# Patient Record
Sex: FEMALE | Race: BLACK OR AFRICAN AMERICAN | NOT HISPANIC OR LATINO | Employment: UNEMPLOYED | URBAN - METROPOLITAN AREA
[De-identification: names, ages, dates, MRNs, and addresses within clinical notes are randomized per-mention and may not be internally consistent; named-entity substitution may affect disease eponyms.]

---

## 2019-08-20 ENCOUNTER — HOSPITAL ENCOUNTER (EMERGENCY)
Facility: HOSPITAL | Age: 64
Discharge: HOME | End: 2019-08-20
Attending: EMERGENCY MEDICINE | Admitting: EMERGENCY MEDICINE
Payer: COMMERCIAL

## 2019-08-20 ENCOUNTER — APPOINTMENT (EMERGENCY)
Dept: CT IMAGING | Facility: HOSPITAL | Age: 64
End: 2019-08-20
Payer: COMMERCIAL

## 2019-08-20 VITALS
DIASTOLIC BLOOD PRESSURE: 70 MMHG | BODY MASS INDEX: 33.45 KG/M2 | RESPIRATION RATE: 13 BRPM | TEMPERATURE: 98.1 F | HEIGHT: 66 IN | OXYGEN SATURATION: 100 % | WEIGHT: 208.11 LBS | HEART RATE: 53 BPM | SYSTOLIC BLOOD PRESSURE: 149 MMHG

## 2019-08-20 DIAGNOSIS — R10.9 ABDOMINAL PAIN: ICD-10-CM

## 2019-08-20 DIAGNOSIS — R07.9 CHEST PAIN: ICD-10-CM

## 2019-08-20 DIAGNOSIS — K76.9 LIVER LESION: Primary | ICD-10-CM

## 2019-08-20 LAB
ALBUMIN SERPL BCP-MCNC: 3.9 G/DL (ref 3.5–5)
ALP SERPL-CCNC: 91 U/L (ref 46–116)
ALT SERPL W P-5'-P-CCNC: 18 U/L (ref 12–78)
ANION GAP SERPL CALCULATED.3IONS-SCNC: 10 MMOL/L (ref 4–13)
APTT PPP: 30 SECONDS (ref 23–37)
AST SERPL W P-5'-P-CCNC: 17 U/L (ref 5–45)
ATRIAL RATE: 68 BPM
BACTERIA UR QL AUTO: ABNORMAL /HPF
BASOPHILS # BLD AUTO: 0.02 THOUSANDS/ΜL (ref 0–0.1)
BASOPHILS NFR BLD AUTO: 0 % (ref 0–1)
BILIRUB DIRECT SERPL-MCNC: 0.17 MG/DL (ref 0–0.2)
BILIRUB SERPL-MCNC: 1 MG/DL (ref 0.2–1)
BILIRUB UR QL STRIP: NEGATIVE
BUN SERPL-MCNC: 11 MG/DL (ref 5–25)
CALCIUM SERPL-MCNC: 9.1 MG/DL (ref 8.3–10.1)
CHLORIDE SERPL-SCNC: 103 MMOL/L (ref 100–108)
CLARITY UR: CLEAR
CO2 SERPL-SCNC: 28 MMOL/L (ref 21–32)
COLOR UR: ABNORMAL
CREAT SERPL-MCNC: 0.97 MG/DL (ref 0.6–1.3)
EOSINOPHIL # BLD AUTO: 0.04 THOUSAND/ΜL (ref 0–0.61)
EOSINOPHIL NFR BLD AUTO: 1 % (ref 0–6)
ERYTHROCYTE [DISTWIDTH] IN BLOOD BY AUTOMATED COUNT: 12.2 % (ref 11.6–15.1)
GFR SERPL CREATININE-BSD FRML MDRD: 71 ML/MIN/1.73SQ M
GLUCOSE SERPL-MCNC: 105 MG/DL (ref 65–140)
GLUCOSE UR STRIP-MCNC: NEGATIVE MG/DL
HCT VFR BLD AUTO: 40.3 % (ref 34.8–46.1)
HGB BLD-MCNC: 13 G/DL (ref 11.5–15.4)
HGB UR QL STRIP.AUTO: ABNORMAL
IMM GRANULOCYTES # BLD AUTO: 0.01 THOUSAND/UL (ref 0–0.2)
IMM GRANULOCYTES NFR BLD AUTO: 0 % (ref 0–2)
INR PPP: 1.05 (ref 0.84–1.19)
KETONES UR STRIP-MCNC: NEGATIVE MG/DL
LEUKOCYTE ESTERASE UR QL STRIP: NEGATIVE
LIPASE SERPL-CCNC: 110 U/L (ref 73–393)
LYMPHOCYTES # BLD AUTO: 1.83 THOUSANDS/ΜL (ref 0.6–4.47)
LYMPHOCYTES NFR BLD AUTO: 35 % (ref 14–44)
MCH RBC QN AUTO: 29.7 PG (ref 26.8–34.3)
MCHC RBC AUTO-ENTMCNC: 32.3 G/DL (ref 31.4–37.4)
MCV RBC AUTO: 92 FL (ref 82–98)
MONOCYTES # BLD AUTO: 0.36 THOUSAND/ΜL (ref 0.17–1.22)
MONOCYTES NFR BLD AUTO: 7 % (ref 4–12)
NEUTROPHILS # BLD AUTO: 3.05 THOUSANDS/ΜL (ref 1.85–7.62)
NEUTS SEG NFR BLD AUTO: 57 % (ref 43–75)
NITRITE UR QL STRIP: NEGATIVE
NON-SQ EPI CELLS URNS QL MICRO: ABNORMAL /HPF
NRBC BLD AUTO-RTO: 0 /100 WBCS
P AXIS: -5 DEGREES
PH UR STRIP.AUTO: 6.5 [PH]
PLATELET # BLD AUTO: 268 THOUSANDS/UL (ref 149–390)
PMV BLD AUTO: 9.7 FL (ref 8.9–12.7)
POTASSIUM SERPL-SCNC: 3.6 MMOL/L (ref 3.5–5.3)
PR INTERVAL: 182 MS
PROT SERPL-MCNC: 8.9 G/DL (ref 6.4–8.2)
PROT UR STRIP-MCNC: NEGATIVE MG/DL
PROTHROMBIN TIME: 13.7 SECONDS (ref 11.6–14.5)
QRS AXIS: 11 DEGREES
QRSD INTERVAL: 76 MS
QT INTERVAL: 400 MS
QTC INTERVAL: 425 MS
RBC # BLD AUTO: 4.38 MILLION/UL (ref 3.81–5.12)
RBC #/AREA URNS AUTO: ABNORMAL /HPF
SODIUM SERPL-SCNC: 141 MMOL/L (ref 136–145)
SP GR UR STRIP.AUTO: 1.01 (ref 1–1.03)
T WAVE AXIS: 87 DEGREES
TROPONIN I SERPL-MCNC: 0.02 NG/ML
TROPONIN I SERPL-MCNC: <0.02 NG/ML
UROBILINOGEN UR QL STRIP.AUTO: 0.2 E.U./DL
VENTRICULAR RATE: 68 BPM
WBC # BLD AUTO: 5.31 THOUSAND/UL (ref 4.31–10.16)
WBC #/AREA URNS AUTO: ABNORMAL /HPF

## 2019-08-20 PROCEDURE — 84484 ASSAY OF TROPONIN QUANT: CPT | Performed by: EMERGENCY MEDICINE

## 2019-08-20 PROCEDURE — 81001 URINALYSIS AUTO W/SCOPE: CPT | Performed by: EMERGENCY MEDICINE

## 2019-08-20 PROCEDURE — 71275 CT ANGIOGRAPHY CHEST: CPT

## 2019-08-20 PROCEDURE — 93005 ELECTROCARDIOGRAM TRACING: CPT

## 2019-08-20 PROCEDURE — 96374 THER/PROPH/DIAG INJ IV PUSH: CPT

## 2019-08-20 PROCEDURE — 85610 PROTHROMBIN TIME: CPT | Performed by: EMERGENCY MEDICINE

## 2019-08-20 PROCEDURE — 99285 EMERGENCY DEPT VISIT HI MDM: CPT | Performed by: EMERGENCY MEDICINE

## 2019-08-20 PROCEDURE — 80076 HEPATIC FUNCTION PANEL: CPT | Performed by: EMERGENCY MEDICINE

## 2019-08-20 PROCEDURE — 85025 COMPLETE CBC W/AUTO DIFF WBC: CPT | Performed by: EMERGENCY MEDICINE

## 2019-08-20 PROCEDURE — 36415 COLL VENOUS BLD VENIPUNCTURE: CPT | Performed by: EMERGENCY MEDICINE

## 2019-08-20 PROCEDURE — 85730 THROMBOPLASTIN TIME PARTIAL: CPT | Performed by: EMERGENCY MEDICINE

## 2019-08-20 PROCEDURE — 96375 TX/PRO/DX INJ NEW DRUG ADDON: CPT

## 2019-08-20 PROCEDURE — 83690 ASSAY OF LIPASE: CPT | Performed by: EMERGENCY MEDICINE

## 2019-08-20 PROCEDURE — 93010 ELECTROCARDIOGRAM REPORT: CPT | Performed by: INTERNAL MEDICINE

## 2019-08-20 PROCEDURE — 80048 BASIC METABOLIC PNL TOTAL CA: CPT | Performed by: EMERGENCY MEDICINE

## 2019-08-20 PROCEDURE — 74175 CTA ABDOMEN W/CONTRAST: CPT

## 2019-08-20 PROCEDURE — 99285 EMERGENCY DEPT VISIT HI MDM: CPT

## 2019-08-20 RX ORDER — HYDROMORPHONE HCL/PF 1 MG/ML
1 SYRINGE (ML) INJECTION ONCE
Status: COMPLETED | OUTPATIENT
Start: 2019-08-20 | End: 2019-08-20

## 2019-08-20 RX ORDER — ONDANSETRON 2 MG/ML
4 INJECTION INTRAMUSCULAR; INTRAVENOUS ONCE
Status: COMPLETED | OUTPATIENT
Start: 2019-08-20 | End: 2019-08-20

## 2019-08-20 RX ADMIN — ONDANSETRON 4 MG: 2 INJECTION INTRAMUSCULAR; INTRAVENOUS at 10:19

## 2019-08-20 RX ADMIN — IOHEXOL 100 ML: 350 INJECTION, SOLUTION INTRAVENOUS at 11:15

## 2019-08-20 RX ADMIN — HYDROMORPHONE HYDROCHLORIDE 1 MG: 1 INJECTION, SOLUTION INTRAMUSCULAR; INTRAVENOUS; SUBCUTANEOUS at 10:19

## 2019-08-20 NOTE — ED PROVIDER NOTES
History  Chief Complaint   Patient presents with    Chest Pain     Patient c/o chest pain, bilateral arm pain, and lower abdominal pain that started yesterday  HPI  [de-identified] year old female past medical history hypertension, hld presents with chest pain that started last night  She states it has been aching and constant  This pain is also in her bilateral arms  This morning also noticed abdominal pain that radiates to her back  Nothing makes better or worse  She states she had chest pain in June and was hospitalized for this where she lives in [place], work up was negative and she was going to get a stress test outpatient but has not done this yet  None       Past Medical History:   Diagnosis Date    Hyperlipidemia     Hypertension        Past Surgical History:   Procedure Laterality Date    ABDOMINAL SURGERY         History reviewed  No pertinent family history  I have reviewed and agree with the history as documented  Social History     Tobacco Use    Smoking status: Never Smoker    Smokeless tobacco: Never Used   Substance Use Topics    Alcohol use: Yes     Comment: occasionally     Drug use: Never        Review of Systems   Constitutional: Negative for chills and fever  HENT: Negative for dental problem and ear pain  Eyes: Negative for pain and redness  Respiratory: Negative for cough and shortness of breath  Cardiovascular: Positive for chest pain  Negative for palpitations  Gastrointestinal: Positive for abdominal pain  Negative for nausea  Endocrine: Negative for polydipsia and polyphagia  Genitourinary: Negative for dysuria and frequency  Musculoskeletal: Positive for arthralgias  Negative for joint swelling  Skin: Negative for color change and rash  Neurological: Negative for dizziness and headaches  Psychiatric/Behavioral: Negative for behavioral problems and confusion  All other systems reviewed and are negative        Physical Exam  Physical Exam Constitutional: She is oriented to person, place, and time  She appears well-developed and well-nourished  No distress  HENT:   Head: Atraumatic  Right Ear: External ear normal    Left Ear: External ear normal    Nose: Nose normal    Eyes: Pupils are equal, round, and reactive to light  Conjunctivae and EOM are normal    Neck: Normal range of motion  Neck supple  No JVD present  Cardiovascular: Normal rate, regular rhythm and normal heart sounds  No murmur heard  Pulmonary/Chest: Effort normal and breath sounds normal  No respiratory distress  She has no wheezes  Abdominal: Soft  Bowel sounds are normal  She exhibits no distension  There is tenderness  Mild diffuse tenderness   Musculoskeletal: Normal range of motion  She exhibits no edema  Neurological: She is alert and oriented to person, place, and time  No cranial nerve deficit  Skin: Skin is warm and dry  Capillary refill takes less than 2 seconds  She is not diaphoretic  Psychiatric: She has a normal mood and affect  Her behavior is normal    Nursing note and vitals reviewed        Vital Signs  ED Triage Vitals   Temperature Pulse Respirations Blood Pressure SpO2   08/20/19 0945 08/20/19 0933 08/20/19 0933 08/20/19 0933 08/20/19 0933   98 1 °F (36 7 °C) 69 16 158/82 100 %      Temp Source Heart Rate Source Patient Position - Orthostatic VS BP Location FiO2 (%)   08/20/19 0945 08/20/19 0933 08/20/19 0933 08/20/19 0933 --   Oral Monitor Sitting Left arm       Pain Score       08/20/19 1019       8           Vitals:    08/20/19 0933 08/20/19 1200   BP: 158/82 149/70   Pulse: 69 (!) 53   Patient Position - Orthostatic VS: Sitting Lying         Visual Acuity      ED Medications  Medications   HYDROmorphone (DILAUDID) injection 1 mg (1 mg Intravenous Given 8/20/19 1019)   ondansetron (ZOFRAN) injection 4 mg (4 mg Intravenous Given 8/20/19 1019)   iohexol (OMNIPAQUE) 350 MG/ML injection (MULTI-DOSE) 100 mL (100 mL Intravenous Given 8/20/19 1115) Diagnostic Studies  Results Reviewed     Procedure Component Value Units Date/Time    Troponin I [[id number]]  (Normal) Collected:  08/20/19 1309    Lab Status:  Final result Specimen:  Blood from Arm, Right Updated:  08/20/19 1332     Troponin I <0 02 ng/mL     Urine Microscopic [[id number]]  (Abnormal) Collected:  08/20/19 1223    Lab Status:  Final result Specimen:  Urine, Clean Catch Updated:  08/20/19 1243     RBC, UA 0-1 /hpf      WBC, UA None Seen /hpf      Epithelial Cells Occasional /hpf      Bacteria, UA Occasional /hpf     UA (URINE) with reflex to Microscopic [[id number]]  (Abnormal) Collected:  08/20/19 1223    Lab Status:  Final result Specimen:  Urine, Clean Catch Updated:  08/20/19 1233     Color, UA Light Yellow     Clarity, UA Clear     Specific Gravity, UA 1 010     pH, UA 6 5     Leukocytes, UA Negative     Nitrite, UA Negative     Protein, UA Negative mg/dl      Glucose, UA Negative mg/dl      Ketones, UA Negative mg/dl      Urobilinogen, UA 0 2 E U /dl      Bilirubin, UA Negative     Blood, UA Trace-lysed    Troponin I [[id number]]  (Normal) Collected:  08/20/19 1017    Lab Status:  Final result Specimen:  Blood from Arm, Left Updated:  08/20/19 1050     Troponin I 0 02 ng/mL     Basic metabolic panel [[id number]] Collected:  08/20/19 1017    Lab Status:  Final result Specimen:  Blood from Arm, Left Updated:  08/20/19 1048     Sodium 141 mmol/L      Potassium 3 6 mmol/L      Chloride 103 mmol/L      CO2 28 mmol/L      ANION GAP 10 mmol/L      BUN 11 mg/dL      Creatinine 0 97 mg/dL      Glucose 105 mg/dL      Calcium 9 1 mg/dL      eGFR 71 ml/min/1 73sq m     Narrative:       Trav guidelines for Chronic Kidney Disease (CKD):     Stage 1 with normal or high GFR (GFR > 90 mL/min/1 73 square meters)    Stage 2 Mild CKD (GFR = 60-89 mL/min/1 73 square meters)    Stage 3A Moderate CKD (GFR = 45-59 mL/min/1 73 square meters)    Stage 3B Moderate CKD (GFR = 30-44 mL/min/1 73 square meters)    Stage 4 Severe CKD (GFR = 15-29 mL/min/1 73 square meters)    Stage 5 End Stage CKD (GFR <15 mL/min/1 73 square meters)  Note: GFR calculation is accurate only with a steady state creatinine    Hepatic function panel [[id number]]  (Abnormal) Collected:  08/20/19 1017    Lab Status:  Final result Specimen:  Blood from Arm, Left Updated:  08/20/19 1048     Total Bilirubin 1 00 mg/dL      Bilirubin, Direct 0 17 mg/dL      Alkaline Phosphatase 91 U/L      AST 17 U/L      ALT 18 U/L      Total Protein 8 9 g/dL      Albumin 3 9 g/dL     Lipase [[id number]]  (Normal) Collected:  08/20/19 1017    Lab Status:  Final result Specimen:  Blood from Arm, Left Updated:  08/20/19 1048     Lipase 110 u/L     Protime-INR [[id number]]  (Normal) Collected:  08/20/19 1018    Lab Status:  Final result Specimen:  Blood from Arm, Left Updated:  08/20/19 1042     Protime 13 7 seconds      INR 1 05    APTT [[id number]]  (Normal) Collected:  08/20/19 1018    Lab Status:  Final result Specimen:  Blood from Arm, Left Updated:  08/20/19 1042     PTT 30 seconds     CBC and differential [[id number]] Collected:  08/20/19 1017    Lab Status:  Final result Specimen:  Blood from Arm, Left Updated:  08/20/19 1038     WBC 5 31 Thousand/uL      RBC 4 38 Million/uL      Hemoglobin 13 0 g/dL      Hematocrit 40 3 %      MCV 92 fL      MCH 29 7 pg      MCHC 32 3 g/dL      RDW 12 2 %      MPV 9 7 fL      Platelets 482 Thousands/uL      nRBC 0 /100 WBCs      Neutrophils Relative 57 %      Immat GRANS % 0 %      Lymphocytes Relative 35 %      Monocytes Relative 7 %      Eosinophils Relative 1 %      Basophils Relative 0 %      Neutrophils Absolute 3 05 Thousands/µL      Immature Grans Absolute 0 01 Thousand/uL      Lymphocytes Absolute 1 83 Thousands/µL      Monocytes Absolute 0 36 Thousand/µL      Eosinophils Absolute 0 04 Thousand/µL      Basophils Absolute 0 02 Thousands/µL                  CTA dissection protocol chest and abdomen Final Result by Pinky Zavala MD (08/20 1156)      1  No acute aortic dissection  2   Small arterially enhancing foci in the liver, indeterminate  Focally prominent distal pancreatic duct without obstructing lesion identified  Both of these findings can be further evaluated on nonemergent MRI of the abdomen with MRCP  The study was marked in EPIC for significant notification  Workstation performed: [id number]                    Procedures  ECG 12 Lead Documentation Only  Date/Time: 8/20/2019 9:59 AM  Performed by: Roger Grijalva MD  Authorized by: Roger Grijalva MD     Comments:      NSR rate of 68, normal axis and intervals, t wave inversions V3-V6           ED Course         HEART Risk Score      Most Recent Value   History  0 Filed at: 08/20/2019 1052   ECG  1 Filed at: 08/20/2019 1052   Age  1 Filed at: 08/20/2019 1052   Risk Factors  1 Filed at: 08/20/2019 1052   Troponin  0 Filed at: 08/20/2019 1052   Heart Score Risk Calculator   History  0 Filed at: 08/20/2019 1052   ECG  1 Filed at: 08/20/2019 1052   Age  1 Filed at: 08/20/2019 1052   Risk Factors  1 Filed at: 08/20/2019 1052   Troponin  0 Filed at: 08/20/2019 1052   HEART Score  3 Filed at: 08/20/2019 1052   HEART Score  3 Filed at: 08/20/2019 1052                            [facility]  60 yo F presents with chest and abdominal pain, chest pain constant since last night and abdominal pain since this morning  Obtained dissection study given history of hypertension and complaint of pain going to back, this was negative except for liver/pancreatic lesion, recommend outpatient MRI for this and discussed with patient  Trop and EKG negative x2, heart score 3 unlikely acs at this time  Labs otherwise unremarkable  Will d/c home with pcp follow up and return to ED for worsening    Disposition  Final diagnoses:   Liver lesion   Chest pain   Abdominal pain     Time reflects when diagnosis was documented in both MDM as applicable and the Disposition within this note     Time User Action Codes Description Comment    8/20/2019 12:02 PM Lawson Drain Add [K76 9] Liver lesion     8/20/2019  1:42 PM Lawson Drain Add [R07 9] Chest pain     8/20/2019  2:04 PM Lawson Drain Add [R10 9] Abdominal pain       ED Disposition     ED Disposition Condition Date/Time Comment    Discharge Stable Tue Aug 20, 2019  1:42 PM [facility] discharge to home/self care  Follow-up Information    None         Patient's Medications    No medications on file     No discharge procedures on file      ED Provider  Electronically Signed by           Debbie Guaman MD  08/20/19 5047

## 2019-08-21 LAB
ATRIAL RATE: 52 BPM
P AXIS: -20 DEGREES
PR INTERVAL: 186 MS
QRS AXIS: 2 DEGREES
QRSD INTERVAL: 88 MS
QT INTERVAL: 466 MS
QTC INTERVAL: 433 MS
T WAVE AXIS: 90 DEGREES
VENTRICULAR RATE: 52 BPM

## 2019-08-21 PROCEDURE — 93010 ELECTROCARDIOGRAM REPORT: CPT | Performed by: INTERNAL MEDICINE

## 2019-11-06 ENCOUNTER — OFFICE VISIT (OUTPATIENT)
Dept: OBGYN CLINIC | Facility: CLINIC | Age: 64
End: 2019-11-06

## 2019-11-06 VITALS
BODY MASS INDEX: 33.01 KG/M2 | HEIGHT: 66 IN | DIASTOLIC BLOOD PRESSURE: 84 MMHG | SYSTOLIC BLOOD PRESSURE: 130 MMHG | WEIGHT: 205.4 LBS

## 2019-11-06 DIAGNOSIS — R35.0 URINARY FREQUENCY: ICD-10-CM

## 2019-11-06 DIAGNOSIS — Z71.9 ENCOUNTER FOR CONSULTATION: ICD-10-CM

## 2019-11-06 DIAGNOSIS — R93.2 ABNORMAL CT OF LIVER: ICD-10-CM

## 2019-11-06 DIAGNOSIS — Z12.31 ENCOUNTER FOR SCREENING MAMMOGRAM FOR MALIGNANT NEOPLASM OF BREAST: Primary | ICD-10-CM

## 2019-11-06 DIAGNOSIS — Z01.419 ENCOUNTER FOR GYNECOLOGICAL EXAMINATION (GENERAL) (ROUTINE) WITHOUT ABNORMAL FINDINGS: ICD-10-CM

## 2019-11-06 PROCEDURE — 87086 URINE CULTURE/COLONY COUNT: CPT | Performed by: STUDENT IN AN ORGANIZED HEALTH CARE EDUCATION/TRAINING PROGRAM

## 2019-11-06 PROCEDURE — S0612 ANNUAL GYNECOLOGICAL EXAMINA: HCPCS | Performed by: STUDENT IN AN ORGANIZED HEALTH CARE EDUCATION/TRAINING PROGRAM

## 2019-11-06 NOTE — PROGRESS NOTES
Assessment/Plan:    60 yo [id number] s/p hysterectomy here for annual exam  Doing well  No gyn complaints  As pt has no h/o cervical dysplasia prior to hysterectomy do not need vaginal pap smear today  Will follow up urine culture due to discomfort on exam today  Pt given multiple referrals today, as below  Will follow up in 1 yr or PRN  Encounter for screening mammogram for malignant neoplasm of breast  -     Mammo screening bilateral w cad; Future    Encounter for gynecological examination (general) (routine) without abnormal findings    Encounter for consultation - pt had presented to ED for chest pain, h/o HTN  -     Ambulatory referral to Cardiology; Future -    Abnormal CT of liver  -     Ambulatory referral to Gastroenterology; Future          Subjective:      Patient ID: Shayla Farah is a 59 y o  female  60 yo F s/p total hysterectomy about 20 years ago in [place] for heavy periods presents for annual exam  Reports she does still have her ovaries  Reports urinary frequency but no dysuria or incontinence  Otherwise no GYN complaints  She presented to the ED recently for chest pain, and had a CT which showed liver cysts  Pt did ask about these today as well as medication for chest pain  She does not have chest pain today  She has no abnormal discharge or vaginal bleeding  We do not have records on pt as she had all care previously in [address]  She reports she did have pap smears prior to hysterectomy and never had any that were abnormal  She does report she has had some pap smears after her hysterectomy that were normal      Last mammogram Nov 2018 - per pt report was normal     Recent colonoscopy was normal per pt report        The following portions of the patient's history were reviewed and updated as appropriate: allergies, current medications, past family history, past medical history, past social history, past surgical history and problem list     Review of Systems Constitutional: Negative  HENT: Negative  Eyes: Negative  Respiratory: Negative  Cardiovascular: Negative  Gastrointestinal: Negative  Endocrine: Negative  Genitourinary: Negative for dyspareunia, dysuria, frequency, menstrual problem, pelvic pain, vaginal discharge and vaginal pain  Musculoskeletal: Negative  Skin: Negative  Allergic/Immunologic: Negative  Neurological: Negative  Hematological: Negative  Psychiatric/Behavioral: Negative  Objective: There were no vitals taken for this visit  Physical Exam   Constitutional: She is oriented to person, place, and time  She appears well-nourished  Neck: Normal range of motion  Cardiovascular: Normal rate  Pulmonary/Chest: Effort normal  Right breast exhibits no mass, no nipple discharge, no skin change and no tenderness  Left breast exhibits no mass, no nipple discharge, no skin change and no tenderness  Breasts are symmetrical    Abdominal: Soft  Genitourinary: Vagina normal  There is no rash or lesion on the right labia  There is no rash or lesion on the left labia  Right adnexum displays no mass  Left adnexum displays no mass  No signs of injury around the vagina  Genitourinary Comments: No lesions in vagina  Mild tenderness on anterior vaginal wall  Neurological: She is alert and oriented to person, place, and time  Skin: Skin is warm and dry  Psychiatric: She has a normal mood and affect  Vitals reviewed

## 2019-11-08 ENCOUNTER — TELEPHONE (OUTPATIENT)
Dept: OBGYN CLINIC | Facility: CLINIC | Age: 64
End: 2019-11-08

## 2019-11-08 LAB — BACTERIA UR CULT: NORMAL

## 2019-11-08 NOTE — TELEPHONE ENCOUNTER
----- Message from Gen Hernandez MD sent at 11/8/2019 10:33 AM EST -----  Please let Giselle Hines know that her urine culture was negative  Thanks!

## 2019-11-11 NOTE — TELEPHONE ENCOUNTER
I called pt # [phone]-[id number] alberto answered phone stated pt not avail but we can speak with her  Per hippa communication consent on file- I advised daughter as directed she agreed to inform pt and was grateful for the information

## 2019-12-04 ENCOUNTER — HOSPITAL ENCOUNTER (OUTPATIENT)
Dept: MAMMOGRAPHY | Facility: CLINIC | Age: 64
Discharge: HOME | End: 2019-12-04
Payer: COMMERCIAL

## 2019-12-04 ENCOUNTER — TELEPHONE (OUTPATIENT)
Dept: OBGYN CLINIC | Facility: CLINIC | Age: 64
End: 2019-12-04

## 2019-12-04 VITALS — HEIGHT: 66 IN | BODY MASS INDEX: 32.95 KG/M2 | WEIGHT: 205 LBS

## 2019-12-04 DIAGNOSIS — Z12.31 ENCOUNTER FOR SCREENING MAMMOGRAM FOR MALIGNANT NEOPLASM OF BREAST: ICD-10-CM

## 2019-12-04 PROCEDURE — 77067 SCR MAMMO BI INCL CAD: CPT

## 2019-12-04 NOTE — TELEPHONE ENCOUNTER
----- Message from Olivia Xiong MD sent at 12/4/2019  4:34 PM EST -----  Please let Kendra Garcia know that her mammo was normal - needs repeat in a year  Thank you!

## 2021-07-29 ENCOUNTER — IMMUNIZATIONS (OUTPATIENT)
Dept: FAMILY MEDICINE CLINIC | Facility: HOSPITAL | Age: 66
End: 2021-07-29

## 2021-07-29 DIAGNOSIS — Z23 ENCOUNTER FOR IMMUNIZATION: Primary | ICD-10-CM

## 2021-07-29 PROCEDURE — 0001A SARS-COV-2 / COVID-19 MRNA VACCINE (PFIZER-BIONTECH) 30 MCG: CPT

## 2021-07-29 PROCEDURE — 91300 SARS-COV-2 / COVID-19 MRNA VACCINE (PFIZER-BIONTECH) 30 MCG: CPT

## 2021-08-20 ENCOUNTER — IMMUNIZATIONS (OUTPATIENT)
Dept: FAMILY MEDICINE CLINIC | Facility: HOSPITAL | Age: 66
End: 2021-08-20

## 2021-08-20 DIAGNOSIS — Z23 ENCOUNTER FOR IMMUNIZATION: Primary | ICD-10-CM

## 2021-08-20 PROCEDURE — 0002A SARS-COV-2 / COVID-19 MRNA VACCINE (PFIZER-BIONTECH) 30 MCG: CPT

## 2021-08-20 PROCEDURE — 91300 SARS-COV-2 / COVID-19 MRNA VACCINE (PFIZER-BIONTECH) 30 MCG: CPT

## 2021-12-06 ENCOUNTER — HOSPITAL ENCOUNTER (EMERGENCY)
Facility: HOSPITAL | Age: 66
Discharge: HOME | End: 2021-12-06
Attending: EMERGENCY MEDICINE | Admitting: EMERGENCY MEDICINE
Payer: COMMERCIAL

## 2021-12-06 VITALS
RESPIRATION RATE: 16 BRPM | SYSTOLIC BLOOD PRESSURE: 175 MMHG | OXYGEN SATURATION: 100 % | DIASTOLIC BLOOD PRESSURE: 89 MMHG | TEMPERATURE: 98.6 F | HEART RATE: 95 BPM

## 2021-12-06 DIAGNOSIS — R05.9 COUGH: ICD-10-CM

## 2021-12-06 DIAGNOSIS — K21.9 GERD (GASTROESOPHAGEAL REFLUX DISEASE): Primary | ICD-10-CM

## 2021-12-06 PROCEDURE — 99284 EMERGENCY DEPT VISIT MOD MDM: CPT | Performed by: NURSE PRACTITIONER

## 2021-12-06 PROCEDURE — 99283 EMERGENCY DEPT VISIT LOW MDM: CPT

## 2021-12-06 PROCEDURE — U0003 INFECTIOUS AGENT DETECTION BY NUCLEIC ACID (DNA OR RNA); SEVERE ACUTE RESPIRATORY SYNDROME CORONAVIRUS 2 (SARS-COV-2) (CORONAVIRUS DISEASE [COVID-19]), AMPLIFIED PROBE TECHNIQUE, MAKING USE OF HIGH THROUGHPUT TECHNOLOGIES AS DESCRIBED BY CMS-2020-01-R: HCPCS | Performed by: NURSE PRACTITIONER

## 2021-12-06 PROCEDURE — U0005 INFEC AGEN DETEC AMPLI PROBE: HCPCS | Performed by: NURSE PRACTITIONER

## 2021-12-06 RX ORDER — PANTOPRAZOLE SODIUM 40 MG/1
40 TABLET, DELAYED RELEASE ORAL DAILY
Qty: 90 TABLET | Refills: 0 | Status: SHIPPED | OUTPATIENT
Start: 2021-12-06 | End: 2022-03-06

## 2021-12-06 RX ORDER — DEXTROMETHORPHAN HYDROBROMIDE AND PROMETHAZINE HYDROCHLORIDE 15; 6.25 MG/5ML; MG/5ML
5 SOLUTION ORAL 4 TIMES DAILY PRN
Qty: 118 ML | Refills: 0 | Status: SHIPPED | OUTPATIENT
Start: 2021-12-06 | End: 2021-12-11

## 2021-12-07 LAB — SARS-COV-2 RNA RESP QL NAA+PROBE: POSITIVE

## 2022-01-10 ENCOUNTER — HOSPITAL ENCOUNTER (OUTPATIENT)
Facility: HOSPITAL | Age: 67
Setting detail: OBSERVATION
Discharge: HOME | End: 2022-01-12
Attending: EMERGENCY MEDICINE | Admitting: HOSPITALIST
Payer: COMMERCIAL

## 2022-01-10 ENCOUNTER — APPOINTMENT (EMERGENCY)
Dept: CT IMAGING | Facility: HOSPITAL | Age: 67
End: 2022-01-10
Payer: COMMERCIAL

## 2022-01-10 DIAGNOSIS — R94.31 ABNORMAL ECG: ICD-10-CM

## 2022-01-10 DIAGNOSIS — R07.9 CHEST PAIN, UNSPECIFIED: ICD-10-CM

## 2022-01-10 DIAGNOSIS — I16.9 HYPERTENSIVE CRISIS: Primary | ICD-10-CM

## 2022-01-10 PROBLEM — R10.9 ABDOMINAL PAIN: Status: ACTIVE | Noted: 2022-01-10

## 2022-01-10 PROBLEM — I16.0 HYPERTENSIVE URGENCY: Status: ACTIVE | Noted: 2022-01-10

## 2022-01-10 PROBLEM — R93.89 ABNORMAL CT SCAN: Status: ACTIVE | Noted: 2022-01-10

## 2022-01-10 LAB
ALBUMIN SERPL BCP-MCNC: 4.1 G/DL (ref 3.5–5)
ALP SERPL-CCNC: 89 U/L (ref 46–116)
ALT SERPL W P-5'-P-CCNC: 25 U/L (ref 12–78)
ANION GAP SERPL CALCULATED.3IONS-SCNC: 10 MMOL/L (ref 4–13)
AST SERPL W P-5'-P-CCNC: 17 U/L (ref 5–45)
BASOPHILS # BLD AUTO: 0.03 THOUSANDS/ΜL (ref 0–0.1)
BASOPHILS NFR BLD AUTO: 0 % (ref 0–1)
BILIRUB DIRECT SERPL-MCNC: 0.16 MG/DL (ref 0–0.2)
BILIRUB SERPL-MCNC: 0.97 MG/DL (ref 0.2–1)
BILIRUB UR QL STRIP: NEGATIVE
BUN SERPL-MCNC: 10 MG/DL (ref 5–25)
CALCIUM SERPL-MCNC: 9.1 MG/DL (ref 8.3–10.1)
CARDIAC TROPONIN I PNL SERPL HS: 16 NG/L
CHLORIDE SERPL-SCNC: 100 MMOL/L (ref 100–108)
CLARITY UR: CLEAR
CO2 SERPL-SCNC: 28 MMOL/L (ref 21–32)
COLOR UR: NORMAL
CREAT SERPL-MCNC: 1.09 MG/DL (ref 0.6–1.3)
EOSINOPHIL # BLD AUTO: 0.08 THOUSAND/ΜL (ref 0–0.61)
EOSINOPHIL NFR BLD AUTO: 1 % (ref 0–6)
ERYTHROCYTE [DISTWIDTH] IN BLOOD BY AUTOMATED COUNT: 12.3 % (ref 11.6–15.1)
GFR SERPL CREATININE-BSD FRML MDRD: 53 ML/MIN/1.73SQ M
GLUCOSE SERPL-MCNC: 102 MG/DL (ref 65–140)
GLUCOSE UR STRIP-MCNC: NEGATIVE MG/DL
HCT VFR BLD AUTO: 41.5 % (ref 34.8–46.1)
HGB BLD-MCNC: 13.5 G/DL (ref 11.5–15.4)
HGB UR QL STRIP.AUTO: NEGATIVE
IMM GRANULOCYTES # BLD AUTO: 0.01 THOUSAND/UL (ref 0–0.2)
IMM GRANULOCYTES NFR BLD AUTO: 0 % (ref 0–2)
KETONES UR STRIP-MCNC: NEGATIVE MG/DL
LEUKOCYTE ESTERASE UR QL STRIP: NEGATIVE
LIPASE SERPL-CCNC: 132 U/L (ref 73–393)
LYMPHOCYTES # BLD AUTO: 3.56 THOUSANDS/ΜL (ref 0.6–4.47)
LYMPHOCYTES NFR BLD AUTO: 41 % (ref 14–44)
MCH RBC QN AUTO: 29.3 PG (ref 26.8–34.3)
MCHC RBC AUTO-ENTMCNC: 32.5 G/DL (ref 31.4–37.4)
MCV RBC AUTO: 90 FL (ref 82–98)
MONOCYTES # BLD AUTO: 0.57 THOUSAND/ΜL (ref 0.17–1.22)
MONOCYTES NFR BLD AUTO: 7 % (ref 4–12)
NEUTROPHILS # BLD AUTO: 4.45 THOUSANDS/ΜL (ref 1.85–7.62)
NEUTS SEG NFR BLD AUTO: 51 % (ref 43–75)
NITRITE UR QL STRIP: NEGATIVE
NRBC BLD AUTO-RTO: 0 /100 WBCS
PH UR STRIP.AUTO: 5.5 [PH]
PLATELET # BLD AUTO: 312 THOUSANDS/UL (ref 149–390)
PMV BLD AUTO: 9.3 FL (ref 8.9–12.7)
POTASSIUM SERPL-SCNC: 3.4 MMOL/L (ref 3.5–5.3)
PROT SERPL-MCNC: 9 G/DL (ref 6.4–8.2)
PROT UR STRIP-MCNC: NEGATIVE MG/DL
RBC # BLD AUTO: 4.6 MILLION/UL (ref 3.81–5.12)
SODIUM SERPL-SCNC: 138 MMOL/L (ref 136–145)
SP GR UR STRIP.AUTO: 1.02 (ref 1–1.03)
UROBILINOGEN UR QL STRIP.AUTO: 0.2 E.U./DL
WBC # BLD AUTO: 8.7 THOUSAND/UL (ref 4.31–10.16)

## 2022-01-10 PROCEDURE — 99285 EMERGENCY DEPT VISIT HI MDM: CPT

## 2022-01-10 PROCEDURE — 96360 HYDRATION IV INFUSION INIT: CPT

## 2022-01-10 PROCEDURE — 80076 HEPATIC FUNCTION PANEL: CPT | Performed by: PHYSICIAN ASSISTANT

## 2022-01-10 PROCEDURE — 93005 ELECTROCARDIOGRAM TRACING: CPT

## 2022-01-10 PROCEDURE — 36415 COLL VENOUS BLD VENIPUNCTURE: CPT | Performed by: PHYSICIAN ASSISTANT

## 2022-01-10 PROCEDURE — 84484 ASSAY OF TROPONIN QUANT: CPT | Performed by: PHYSICIAN ASSISTANT

## 2022-01-10 PROCEDURE — 83690 ASSAY OF LIPASE: CPT | Performed by: PHYSICIAN ASSISTANT

## 2022-01-10 PROCEDURE — 99220 PR INITIAL OBSERVATION CARE/DAY 70 MINUTES: CPT

## 2022-01-10 PROCEDURE — 81003 URINALYSIS AUTO W/O SCOPE: CPT | Performed by: PHYSICIAN ASSISTANT

## 2022-01-10 PROCEDURE — 74177 CT ABD & PELVIS W/CONTRAST: CPT

## 2022-01-10 PROCEDURE — 99285 EMERGENCY DEPT VISIT HI MDM: CPT | Performed by: PHYSICIAN ASSISTANT

## 2022-01-10 PROCEDURE — 80048 BASIC METABOLIC PNL TOTAL CA: CPT | Performed by: PHYSICIAN ASSISTANT

## 2022-01-10 PROCEDURE — 85025 COMPLETE CBC W/AUTO DIFF WBC: CPT | Performed by: PHYSICIAN ASSISTANT

## 2022-01-10 PROCEDURE — 70450 CT HEAD/BRAIN W/O DYE: CPT

## 2022-01-10 RX ORDER — NIFEDIPINE 10 MG/1
10 CAPSULE ORAL ONCE
Status: COMPLETED | OUTPATIENT
Start: 2022-01-10 | End: 2022-01-10

## 2022-01-10 RX ORDER — ACETAMINOPHEN 325 MG/1
650 TABLET ORAL EVERY 6 HOURS PRN
Status: DISCONTINUED | OUTPATIENT
Start: 2022-01-10 | End: 2022-01-12 | Stop reason: HOSPADM

## 2022-01-10 RX ORDER — HYDRALAZINE HYDROCHLORIDE 20 MG/ML
5 INJECTION INTRAMUSCULAR; INTRAVENOUS EVERY 6 HOURS PRN
Status: DISCONTINUED | OUTPATIENT
Start: 2022-01-10 | End: 2022-01-12 | Stop reason: HOSPADM

## 2022-01-10 RX ORDER — ASPIRIN 325 MG
325 TABLET ORAL ONCE
Status: COMPLETED | OUTPATIENT
Start: 2022-01-10 | End: 2022-01-11

## 2022-01-10 RX ORDER — ATENOLOL 50 MG/1
50 TABLET ORAL DAILY
COMMUNITY
End: 2022-01-11 | Stop reason: SDUPTHER

## 2022-01-10 RX ORDER — NIFEDIPINE 10 MG/1
10 CAPSULE ORAL 3 TIMES DAILY
COMMUNITY
End: 2022-01-11 | Stop reason: SDUPTHER

## 2022-01-10 RX ORDER — ATENOLOL 50 MG/1
50 TABLET ORAL DAILY
Status: DISCONTINUED | OUTPATIENT
Start: 2022-01-11 | End: 2022-01-12 | Stop reason: HOSPADM

## 2022-01-10 RX ORDER — NIFEDIPINE 10 MG/1
10 CAPSULE ORAL 3 TIMES DAILY
Status: DISCONTINUED | OUTPATIENT
Start: 2022-01-11 | End: 2022-01-12 | Stop reason: HOSPADM

## 2022-01-10 RX ORDER — ATENOLOL 50 MG/1
50 TABLET ORAL ONCE
Status: COMPLETED | OUTPATIENT
Start: 2022-01-10 | End: 2022-01-10

## 2022-01-10 RX ADMIN — SODIUM CHLORIDE 1000 ML: 0.9 INJECTION, SOLUTION INTRAVENOUS at 21:09

## 2022-01-10 RX ADMIN — IOHEXOL 100 ML: 350 INJECTION, SOLUTION INTRAVENOUS at 21:42

## 2022-01-10 RX ADMIN — ATENOLOL 50 MG: 50 TABLET ORAL at 20:25

## 2022-01-10 RX ADMIN — NIFEDIPINE 10 MG: 10 CAPSULE ORAL at 20:25

## 2022-01-11 LAB
2HR DELTA HS TROPONIN: 1 NG/L
4HR DELTA HS TROPONIN: -3 NG/L
ANION GAP SERPL CALCULATED.3IONS-SCNC: 10 MMOL/L (ref 4–13)
ATRIAL RATE: 69 BPM
BASOPHILS # BLD AUTO: 0.02 THOUSANDS/ΜL (ref 0–0.1)
BASOPHILS NFR BLD AUTO: 0 % (ref 0–1)
BUN SERPL-MCNC: 8 MG/DL (ref 5–25)
CALCIUM SERPL-MCNC: 8.5 MG/DL (ref 8.3–10.1)
CARDIAC TROPONIN I PNL SERPL HS: 13 NG/L
CARDIAC TROPONIN I PNL SERPL HS: 17 NG/L
CHLORIDE SERPL-SCNC: 102 MMOL/L (ref 100–108)
CO2 SERPL-SCNC: 27 MMOL/L (ref 21–32)
CREAT SERPL-MCNC: 0.96 MG/DL (ref 0.6–1.3)
EOSINOPHIL # BLD AUTO: 0.05 THOUSAND/ΜL (ref 0–0.61)
EOSINOPHIL NFR BLD AUTO: 1 % (ref 0–6)
ERYTHROCYTE [DISTWIDTH] IN BLOOD BY AUTOMATED COUNT: 12.2 % (ref 11.6–15.1)
GFR SERPL CREATININE-BSD FRML MDRD: 61 ML/MIN/1.73SQ M
GLUCOSE SERPL-MCNC: 107 MG/DL (ref 65–140)
HCT VFR BLD AUTO: 38.9 % (ref 34.8–46.1)
HGB BLD-MCNC: 12.8 G/DL (ref 11.5–15.4)
IMM GRANULOCYTES # BLD AUTO: 0.02 THOUSAND/UL (ref 0–0.2)
IMM GRANULOCYTES NFR BLD AUTO: 0 % (ref 0–2)
LYMPHOCYTES # BLD AUTO: 2.54 THOUSANDS/ΜL (ref 0.6–4.47)
LYMPHOCYTES NFR BLD AUTO: 28 % (ref 14–44)
MCH RBC QN AUTO: 29.7 PG (ref 26.8–34.3)
MCHC RBC AUTO-ENTMCNC: 32.9 G/DL (ref 31.4–37.4)
MCV RBC AUTO: 90 FL (ref 82–98)
MONOCYTES # BLD AUTO: 0.59 THOUSAND/ΜL (ref 0.17–1.22)
MONOCYTES NFR BLD AUTO: 7 % (ref 4–12)
NEUTROPHILS # BLD AUTO: 5.72 THOUSANDS/ΜL (ref 1.85–7.62)
NEUTS SEG NFR BLD AUTO: 64 % (ref 43–75)
NRBC BLD AUTO-RTO: 0 /100 WBCS
P AXIS: 72 DEGREES
PLATELET # BLD AUTO: 309 THOUSANDS/UL (ref 149–390)
PMV BLD AUTO: 9.3 FL (ref 8.9–12.7)
POTASSIUM SERPL-SCNC: 3.2 MMOL/L (ref 3.5–5.3)
PR INTERVAL: 182 MS
QRS AXIS: 52 DEGREES
QRSD INTERVAL: 76 MS
QT INTERVAL: 428 MS
QTC INTERVAL: 458 MS
RBC # BLD AUTO: 4.31 MILLION/UL (ref 3.81–5.12)
SODIUM SERPL-SCNC: 139 MMOL/L (ref 136–145)
T WAVE AXIS: -27 DEGREES
VENTRICULAR RATE: 69 BPM
WBC # BLD AUTO: 8.94 THOUSAND/UL (ref 4.31–10.16)

## 2022-01-11 PROCEDURE — 84484 ASSAY OF TROPONIN QUANT: CPT

## 2022-01-11 PROCEDURE — 80048 BASIC METABOLIC PNL TOTAL CA: CPT

## 2022-01-11 PROCEDURE — 85025 COMPLETE CBC W/AUTO DIFF WBC: CPT

## 2022-01-11 PROCEDURE — 36415 COLL VENOUS BLD VENIPUNCTURE: CPT

## 2022-01-11 PROCEDURE — 93010 ELECTROCARDIOGRAM REPORT: CPT | Performed by: INTERNAL MEDICINE

## 2022-01-11 PROCEDURE — 93005 ELECTROCARDIOGRAM TRACING: CPT

## 2022-01-11 RX ORDER — ATENOLOL 50 MG/1
50 TABLET ORAL DAILY
Qty: 30 TABLET | Refills: 0 | Status: SHIPPED | OUTPATIENT
Start: 2022-01-11 | End: 2022-02-10

## 2022-01-11 RX ORDER — DOCUSATE SODIUM 100 MG/1
100 CAPSULE, LIQUID FILLED ORAL 2 TIMES DAILY
Qty: 60 CAPSULE | Refills: 0 | Status: SHIPPED | OUTPATIENT
Start: 2022-01-11 | End: 2022-01-11 | Stop reason: SDUPTHER

## 2022-01-11 RX ORDER — NIFEDIPINE 10 MG/1
10 CAPSULE ORAL 3 TIMES DAILY
Qty: 90 CAPSULE | Refills: 0 | Status: SHIPPED | OUTPATIENT
Start: 2022-01-11 | End: 2022-01-11 | Stop reason: SDUPTHER

## 2022-01-11 RX ORDER — ATENOLOL 50 MG/1
50 TABLET ORAL DAILY
Qty: 30 TABLET | Refills: 0 | Status: SHIPPED | OUTPATIENT
Start: 2022-01-11 | End: 2022-01-11 | Stop reason: SDUPTHER

## 2022-01-11 RX ORDER — POTASSIUM CHLORIDE 20 MEQ/1
40 TABLET, EXTENDED RELEASE ORAL ONCE
Status: COMPLETED | OUTPATIENT
Start: 2022-01-11 | End: 2022-01-11

## 2022-01-11 RX ORDER — NIFEDIPINE 10 MG/1
10 CAPSULE ORAL 3 TIMES DAILY
Qty: 90 CAPSULE | Refills: 0 | Status: SHIPPED | OUTPATIENT
Start: 2022-01-11 | End: 2022-02-10

## 2022-01-11 RX ORDER — DOCUSATE SODIUM 100 MG/1
100 CAPSULE, LIQUID FILLED ORAL 2 TIMES DAILY
Qty: 60 CAPSULE | Refills: 0 | Status: SHIPPED | OUTPATIENT
Start: 2022-01-11 | End: 2022-02-10

## 2022-01-11 RX ADMIN — POTASSIUM CHLORIDE 40 MEQ: 1500 TABLET, EXTENDED RELEASE ORAL at 08:27

## 2022-01-11 RX ADMIN — NIFEDIPINE 10 MG: 10 CAPSULE ORAL at 08:27

## 2022-01-11 RX ADMIN — NIFEDIPINE 10 MG: 10 CAPSULE ORAL at 15:15

## 2022-01-11 RX ADMIN — HYDRALAZINE HYDROCHLORIDE 5 MG: 20 INJECTION INTRAMUSCULAR; INTRAVENOUS at 05:12

## 2022-01-11 RX ADMIN — ATENOLOL 50 MG: 50 TABLET ORAL at 08:27

## 2022-01-11 RX ADMIN — ASPIRIN 325 MG ORAL TABLET 325 MG: 325 PILL ORAL at 00:02

## 2022-01-11 RX ADMIN — NIFEDIPINE 10 MG: 10 CAPSULE ORAL at 22:41

## 2022-01-11 RX ADMIN — ENOXAPARIN SODIUM 40 MG: 40 INJECTION SUBCUTANEOUS at 08:27

## 2022-01-11 RX ADMIN — NIFEDIPINE 10 MG: 10 CAPSULE ORAL at 00:25

## 2022-01-11 RX ADMIN — ATENOLOL 50 MG: 50 TABLET ORAL at 00:24

## 2022-01-11 NOTE — ASSESSMENT & PLAN NOTE
· CT abdomen pelvis negative for any acute findings besides bladder thickening? Cystitis although UA negative  · Patient does report some constipation for which she has been started on Colace  · This morning she denies any symptoms of abdominal pain

## 2022-01-11 NOTE — ASSESSMENT & PLAN NOTE
Patient reports running out of her home BP medications around 1 week ago  Around 2 days ago she began developed on/off frontal headaches, suprapubic abdominal pain, substernal chest discomfort, and noticed her blood pressure was around 220/110 at home  Denies fever, chill, recent sick contact, current headache, visual disturbance, dizzy/lightheadedness, chest pain, palpitations, SOB, N/V/D, urinary complaints, hematuria, flank pain, new numbness/tingling/weakness, lower extremity pain/swelling, or other symptom      /71 (BP Location: Right arm)   Pulse 78   Temp 97 8 °F (36 6 °C) (Tympanic)   Resp 20   SpO2 99%     · Reporting on/off frontal headaches (not sudden or maximal intensity), suprapubic pressure, substernal chest discomfort, and elevated BP (220/110) at home since yesterday  · /111 max in ED, was given her home atenolol 50mg and nifedipine 10mg  · BP currently 147/71; HR78  · Patient reports obtaining her home BP meds shipped from [facility] and ran out around one month ago  · CT head without acute process  · Continue home HTN medications, prn hydralazine, see chest/abd pain A/P's as below  · Monitor BP per unit protocol   · Stressed importance of medication compliance

## 2022-01-11 NOTE — CASE MANAGEMENT
Case Management Discharge Planning Note    Patient name Silvestre Ontiveros  Location ED 24/ED 24 MRN [id number]  : 1955 Date 2022       Current Admission Date: 1/10/2022  Current Admission Diagnosis:Hypertensive urgency   Patient Active Problem List    Diagnosis Date Noted    Hypertensive urgency 01/10/2022    Abdominal pain 01/10/2022    Hyperlipidemia     Chest pain       LOS (days): 0  Geometric Mean LOS (GMLOS) (days):   Days to GMLOS:     OBJECTIVE:      Bundled Patient Payment:  (no)     Current admission status: Observation   Preferred Pharmacy:   [address] [place], [place] [id number] R R 1 [phone] R R [phone]  [address] [facility]  Phone: [phone] Fax: [address] [facility] [place] [address] [address]  [address]  [address] [facility] [address] [address]  Phone: [phone] Fax: [phone]    Primary Care Provider: No primary care provider on file  Primary Insurance:   Secondary Insurance:     DISCHARGE DETAILS:    Discharge planning discussed with[de-identified] pt daughter cannot get to pharmacy until tomorrow morning due to ice  per MD pt will need nifedipine prior to d c RN will give at 3p   lyft set up for 4p  waiver on file  all parties notified of details  daughter pref for [facility]--MD updated  [place] of Choice: Yes     CM contacted family/caregiver?: Yes  Were Treatment Team discharge recommendations reviewed with patient/caregiver?: Yes  Did patient/caregiver verbalize understanding of patient care needs?: Yes  Were patient/caregiver advised of the risks associated with not following Treatment Team discharge recommendations?: Yes         Requested  [address]         Is the patient interested in [facility] at discharge?: No    DME Referral Provided  Referral made for DME?: No    Other Referral/Resources/Interventions Provided:  Financial Resources Provided:  (ins card emailed to business office   pt new RX, daughter can afford at [facility]  ins will not cover as staying in [place])    Would you like to participate in our [address] service program?  : No - Declined       Discharge Destination Plan[de-identified] Home

## 2022-01-11 NOTE — ASSESSMENT & PLAN NOTE
Patient reported mild nonradiating substernal chest discomfort not affected with activity not associated with shortness of breath or nausea vomiting diaphoresis  · Likely related to hypertensive urgency  · Currently asymptomatic  · Negative troponin readings  · No events noted on telemetry  · Discussed importance of medication compliance

## 2022-01-11 NOTE — H&P
[facility]  H&P- Silvestre Mantel 1955, 77 y o  female MRN: [id number]  Unit/Bed#: ED 24 Encounter: [id number]  Primary Care Provider: No primary care provider on file  Date and time admitted to hospital: 1/10/2022  7:38 PM    * Hypertensive urgency  Assessment & Plan  Patient reports running out of her home BP medications around 1 week ago  Around 2 days ago she began developed on/off frontal headaches, suprapubic abdominal pain, substernal chest discomfort, and noticed her blood pressure was around 220/110 at home  Denies fever, chill, recent sick contact, current headache, visual disturbance, dizzy/lightheadedness, chest pain, palpitations, SOB, N/V/D, urinary complaints, hematuria, flank pain, new numbness/tingling/weakness, lower extremity pain/swelling, or other symptom      /71 (BP Location: Right arm)   Pulse 78   Temp 97 8 °F (36 6 °C) (Tympanic)   Resp 20   SpO2 99%     · Reporting on/off frontal headaches (not sudden or maximal intensity), suprapubic pressure, substernal chest discomfort, and elevated BP (220/110) at home since yesterday  · /111 max in ED, was given her home atenolol 50mg and nifedipine 10mg  · BP currently 147/71; HR78  · Patient reports obtaining her home BP meds shipped from [facility] and ran out around one month ago  · CT head without acute process  · Continue home HTN medications, prn hydralazine, see chest/abd pain A/P's as below  · Monitor BP per unit protocol   · Stressed importance of medication compliance       Chest pain  Assessment & Plan  · Reports mild, non-radiating substernal chest discomfort, not affected with activity   · HS troponin 0H:16  · EKG SR HR88 w/inferolateral T-wave inversions  · PAT 2  · ED ordered   · Chest pain possibly in the setting of hypertensive urgency (see A/P as above)  · Tele monitoring, trend troponin/EKG  · Consider AM cardiology consult     Abdominal pain  Assessment & Plan  · Reporting suprapubic abdominal pressure since yesterday, and constipation  · Denies urinary complaints, hematuria, or flank pain  · CT ABD/pelvis showing bladder thickening suggestive of possible cystitis  · UA negative for UTI   · Afebrile; no leukocytosis; lipase WNL    Hyperlipidemia  Assessment & Plan  · Reports is not currently taking outpatient statin medication      VTE Pharmacologic Prophylaxis: VTE Score: 3 Moderate Risk (Score 3-4) - Pharmacological DVT Prophylaxis Ordered: enoxaparin (Lovenox)  Code Status: Level 1 - Full Code Level 1 Full Code   Discussion with family: udpate in AM      Anticipated Length of Stay: Patient will be admitted on an observation basis with an anticipated length of stay of less than 2 midnights secondary to chest pain and hypertensive urgency  Total Time for Visit, including Counseling / Coordination of Care: 45 minutes Greater than 50% of this total time spent on direct patient counseling and coordination of care  Chief Complaint: abdominal pain and high blood presure    History of Present Illness:  Flori Munoz is a 77 y o  female with a PMH of HTN, HLP, and migraines who presents with abdominal pain and high blood pressure  Patient reports running out of her home BP medications around 1 week ago  Around 2 days ago she began developed on/off frontal headaches, suprapubic abdominal pain, substernal chest discomfort, and noticed her blood pressure was around 220/110 at home  Denies fever, chill, recent sick contact, current headache, visual disturbance, dizzy/lightheadedness, chest pain, palpitations, SOB, N/V/D, urinary complaints, hematuria, flank pain, new numbness/tingling/weakness, lower extremity pain/swelling, or other symptom  All questions answered at the bedside to the patient's satisfaction  Review of Systems:  Review of Systems   Constitutional: Negative for activity change, appetite change, chills, diaphoresis, fatigue and fever     HENT: Negative for congestion, ear pain, nosebleeds and trouble swallowing  Eyes: Negative for pain and visual disturbance  Respiratory: Negative for apnea, cough, chest tightness, shortness of breath and wheezing  Cardiovascular: Positive for chest pain  Negative for palpitations and leg swelling  Gastrointestinal: Positive for abdominal pain and constipation  Negative for abdominal distention, blood in stool, diarrhea, nausea and vomiting  Endocrine: Negative for cold intolerance, heat intolerance and polyuria  Genitourinary: Negative for difficulty urinating, dysuria, flank pain and hematuria  Musculoskeletal: Negative for arthralgias, neck pain and neck stiffness  Skin: Negative for color change, rash and wound  Neurological: Positive for headaches  Negative for dizziness, tremors, syncope, weakness, light-headedness and numbness  Hematological: Negative for adenopathy  All other systems reviewed and are negative  Past Medical and Surgical History:   Past Medical History:   Diagnosis Date    Fibroids     Hyperlipidemia     Hypertension     Migraines        Past Surgical History:   Procedure Laterality Date    ABDOMINAL SURGERY      HYSTERECTOMY         Meds/Allergies:  Prior to Admission medications    Medication Sig Start Date End Date Taking? Authorizing Provider   atenolol (TENORMIN) 50 mg tablet Take 50 mg by mouth daily   Yes Historical Provider, MD   NIFEdipine (PROCARDIA) 10 mg capsule Take 10 mg by mouth 3 (three) times a day   Yes Historical Provider, MD   pantoprazole (PROTONIX) 40 mg tablet Take 1 tablet (40 mg total) by mouth daily 12/6/21 3/6/22  CLEMENTE King     I have reviewed home medications with patient personally      Allergies: No Known Allergies    Social History:  Marital Status: Single   Occupation: N/A  Patient Pre-hospital Living Situation: Home  Patient Pre-hospital Level of Mobility: walks  Patient Pre-hospital Diet Restrictions: None  Substance Use History:   Social History Substance and Sexual Activity   Alcohol Use Yes    Comment: occasionally      Social History     Tobacco Use   Smoking Status Never Smoker   Smokeless Tobacco Never Used     Social History     Substance and Sexual Activity   Drug Use Never       Family History:  Family History   Problem Relation Age of Onset    Ovarian cancer Mother     Breast cancer Sister 64    Hypertension Sister        Physical Exam:     Vitals:   Blood Pressure: 147/71 (01/10/22 2235)  Pulse: 78 (01/10/22 2235)  Temperature: 97 8 °F (36 6 °C) (01/10/22 1715)  Temp Source: Tympanic (01/10/22 1715)  Respirations: 20 (01/10/22 2235)  SpO2: 99 % (01/10/22 2235)    Physical Exam  Vitals and nursing note reviewed  Constitutional:       General: She is not in acute distress  Appearance: Normal appearance  She is obese  HENT:      Head: Normocephalic and atraumatic  Right Ear: External ear normal       Left Ear: External ear normal       Nose: Nose normal       Mouth/Throat:      Mouth: Mucous membranes are moist    Eyes:      Pupils: Pupils are equal, round, and reactive to light  Cardiovascular:      Rate and Rhythm: Normal rate and regular rhythm  Pulses: Normal pulses  Heart sounds: Normal heart sounds  No murmur heard  Pulmonary:      Effort: Pulmonary effort is normal  No respiratory distress  Breath sounds: Normal breath sounds  No wheezing or rales  Chest:      Chest wall: No tenderness  Abdominal:      General: Bowel sounds are normal  There is no distension  Palpations: Abdomen is soft  There is no mass  Tenderness: There is abdominal tenderness (generalized, with focal tenderness in suprabupic region)  There is no guarding  Musculoskeletal:         General: No swelling or tenderness  Cervical back: Normal range of motion and neck supple  No rigidity or tenderness  Right lower leg: No edema  Left lower leg: No edema  Skin:     General: Skin is warm and dry  Capillary Refill: Capillary refill takes less than 2 seconds  Findings: No lesion or rash  Neurological:      General: No focal deficit present  Mental Status: She is alert  Psychiatric:         Mood and Affect: Mood normal           Additional Data:     Lab Results:  Results from last 7 days   Lab Units 01/10/22  2108   WBC Thousand/uL 8 70   HEMOGLOBIN g/dL 13 5   HEMATOCRIT % 41 5   PLATELETS Thousands/uL 312   NEUTROS PCT % 51   LYMPHS PCT % 41   MONOS PCT % 7   EOS PCT % 1     Results from last 7 days   Lab Units 01/10/22  2108   SODIUM mmol/L 138   POTASSIUM mmol/L 3 4*   CHLORIDE mmol/L 100   CO2 mmol/L 28   BUN mg/dL 10   CREATININE mg/dL 1 09   ANION GAP mmol/L 10   CALCIUM mg/dL 9 1   ALBUMIN g/dL 4 1   TOTAL BILIRUBIN mg/dL 0 97   ALK PHOS U/L 89   ALT U/L 25   AST U/L 17   GLUCOSE RANDOM mg/dL 102                       Imaging: Reviewed radiology reports from this admission including: abdominal/pelvic CT and CT head  CT head without contrast   Final Result by Li Coffey MD (01/10 2221)      No acute intracranial abnormality  Workstation performed: [id number]         CT abdomen pelvis with contrast   Final Result by Marcie Romero MD (01/10 2252)      Mild circumferential wall thickening of the urinary bladder which may be related to underdistention or cystitis  Correlate with urinalysis  Workstation performed: [id number]             EKG and Other Studies Reviewed on Admission:   · EKG: SR HR88 w/inferolateral T-wave inversions  ** Please Note: This note has been constructed using a voice recognition system   **

## 2022-01-11 NOTE — CASE MANAGEMENT
Case Management Discharge Planning Note    Patient name Elton Wolf  Location ED 24/ED 24 MRN [id number]  : 1955 Date 2022       Current Admission Date: 1/10/2022  Current Admission Diagnosis:Hypertensive urgency   Patient Active Problem List    Diagnosis Date Noted    Hypertensive urgency 01/10/2022    Abdominal pain 01/10/2022    Hyperlipidemia     Chest pain       LOS (days): 0  Geometric Mean LOS (GMLOS) (days):   Days to GMLOS:     OBJECTIVE:      Bundled Patient Payment:  (no)     Current admission status: Observation   Preferred Pharmacy:   [address] [place], [place] [id number] R R [phone] R [phone]  [address] [place] [address]  Phone: [phone] Fax: 5 [address] [place] [address]  Phone: [phone] Fax: [phone]    Primary Care Provider: No primary care provider on file      Primary Insurance:   Secondary Insurance:     DISCHARGE DETAILS:    Discharge planning discussed with[de-identified] RN request to cancel lyft, canceled

## 2022-01-11 NOTE — DISCHARGE SUMMARY
[facility]  Discharge- Laurel Cease 1955, 77 y o  female MRN: [id number]  Unit/Bed#: ED 24 Encounter: [id number]  Primary Care Provider: No primary care provider on file  Date and time admitted to hospital: 1/10/2022  7:38 PM    * Hypertensive urgency  Assessment & Plan  51-year-old lady with underlying history of hypertension maintained on atenolol/nifedipine typically [place] has been out of her medication for almost 1 week now  She presented to the ED with symptoms of frontal headache/substernal chest discomfort and elevated blood pressure readings at home  · Initial blood pressure reading in the /105  · Patient received her home medications at [facility]/nifedipine in the ER following which her blood pressure readings have been in the 301C systolic  · Continue atenolol/nifedipine (new prescriptionsWritten)  ·  on board to ensure patient can afford these medications  · Please note CT head negative for any acute findings  · Discussed with patient importance of compliance at discharge  · She needs outpatient follow-up with PCP in 1 week  Chest pain  Assessment & Plan  Patient reported mild nonradiating substernal chest discomfort not affected with activity not associated with shortness of breath or nausea vomiting diaphoresis  · Likely related to hypertensive urgency  · Currently asymptomatic  · Negative troponin readings  · No events noted on telemetry  · Discussed importance of medication compliance  Hyperlipidemia  Assessment & Plan  · Reports is not currently taking outpatient statin medication  · Needs outpatient follow-up with PCP  Abdominal pain  Assessment & Plan  · CT abdomen pelvis negative for any acute findings besides bladder thickening? Cystitis although UA negative  · Patient does report some constipation for which she has been started on Colace    · This morning she denies any symptoms of abdominal pain  Discharging Physician / Practitioner: Darrian Singh MD  PCP: No primary care provider on file  Admission Date:   Admission Orders (From admission, onward)     Ordered        01/10/22 6147  Place in Observation  Once                      Discharge Date: 01/11/22    Disposition:      · home    Reason for Admission: headache, elevated BP readings    Discharge Diagnoses:     Please see assessment and plan section above for further details regarding discharge diagnoses  Medical Problems             Resolved Problems  Date Reviewed: 1/10/2022    None              Medication Adjustments and Discharge Medications:  · Summary of Medication Adjustments made as a result of this hospitalization: see med rec  · Medication Dosing Tapers - Please refer to Discharge Medication List for details on any medication dosing tapers (if applicable to patient)  · Medications being temporarily held (include recommended restart time): see med rec  · Discharge Medication List: See after visit summary for reconciled discharge medications  Diet Recommendations at Discharge:  Diet -        Diet Orders   (From admission, onward)             Start     Ordered    01/10/22 4845  Diet Cardiovascular; Cardiac; No Chocolate, No Caffeine  Diet effective now        References:    Nutrtion Support Algorithm Enteral vs  Parenteral   Question Answer Comment   Diet Type Cardiovascular    Cardiac Cardiac    Other Restriction(s): No Chocolate    Other Restriction(s): No Caffeine    RD to adjust diet per protocol? Yes        01/10/22 0449                Hospital Course:     Elias Thomas is a 77 y o  female patient who originally presented to the hospital on 1/10/2022 due to complaints of elevated blood pressure readings at home  [place] and has run out for blood pressure medications approximately 1 week ago    She presented to the ER with symptoms of frontal headache/suprapubic abdominal pain/substernal chest discomfort and noticing her blood pressure being 220/110 at home  · Hypertensive urgency, initial blood pressure reading in the /105  She received her home medications atenolol/nifedipine in the ER following which her blood pressure readings have been in the 399 systolic  New prescriptions written for the same medication and  on board to ensure patient can afford these medications  Discussed with patient regarding compliance  She needs outpatient follow-up with PCP in 1 week  · For her chest pain/abdominal pain, CT head negative for acute finding/CT abdomen pelvis negative for any acute findings  Troponin trend negative/telemetry no events noted  UA negative  Likely related to hypertensive urgency now symptoms resolved  · Patient needs close outpatient follow-up with PCP  Patient agreeable with plan above  Patient being discharged home  Condition at Discharge: good     Discharge Day Visit / Exam:     Vitals: Blood Pressure: 158/81 (01/11/22 0822)  Pulse: 66 (01/11/22 0822)  Temperature: 98 °F (36 7 °C) (01/11/22 0505)  Temp Source: Tympanic (01/10/22 1715)  Respirations: 18 (01/11/22 0822)  Height: 5' 6" (167 6 cm) (01/11/22 0505)  Weight - Scale: 92 2 kg (203 lb 4 2 oz) (01/11/22 0505)  SpO2: 99 % (01/11/22 1208)  Exam:   Physical Exam  Constitutional:       General: She is not in acute distress  Appearance: She is obese  She is not toxic-appearing  HENT:      Mouth/Throat:      Mouth: Mucous membranes are moist       Pharynx: Oropharynx is clear  Cardiovascular:      Rate and Rhythm: Normal rate and regular rhythm  Pulses: Normal pulses  Pulmonary:      Effort: Pulmonary effort is normal  No respiratory distress  Breath sounds: Normal breath sounds  Abdominal:      General: There is distension  Tenderness: There is no abdominal tenderness  There is no guarding or rebound     Musculoskeletal:      Right lower leg: No edema  Left lower leg: No edema  Neurological:      General: No focal deficit present  Mental Status: She is alert and oriented to person, place, and time  Psychiatric:         Mood and Affect: Mood normal          Discussion with Family: Attempted to call daughter, but no voicemail set up    Goals of Care Discussions:  · Code Status at Discharge: Level 1 - Full Code    Discharge instructions/Information to patient and family:   See after visit summary section titled Discharge Instructions for information provided to patient and family  Discharge Statement:  I spent 45 minutes discharging the patient  This time was spent on the day of discharge  I had direct contact with the patient on the day of discharge  Greater than 50% of the total time was spent examining patient, answering all patient questions, arranging and discussing plan of care with patient as well as directly providing post-discharge instructions  Additional time then spent on discharge activities      ** Please Note: This note has been constructed using a voice recognition system **

## 2022-01-11 NOTE — ASSESSMENT & PLAN NOTE
66-year-old lady with underlying history of hypertension maintained on atenolol/nifedipine typically but [place] has been out of her medication for almost 1 week now  She presented to the ED with symptoms of frontal headache/substernal chest discomfort and elevated blood pressure readings at home  · Initial blood pressure reading in the /105  · Patient received her home medications atenolol/nifedipine in the ER following which her blood pressure readings have been in the 593E systolic  · Continue atenolol/nifedipine (new prescriptionsWritten)  ·  on board to ensure patient can afford these medications  · Please note CT head negative for any acute findings  · Discussed with patient importance of compliance at discharge  · She needs outpatient follow-up with PCP in 1 week

## 2022-01-11 NOTE — ASSESSMENT & PLAN NOTE
· Reports mild, non-radiating substernal chest discomfort, not affected with activity   · HS troponin 0H:16  · EKG SR HR88 w/inferolateral T-wave inversions  · PAT 2  · ED ordered   · Chest pain possibly in the setting of hypertensive urgency (see A/P as above)  · Tele monitoring, trend troponin/EKG  · Consider AM cardiology consult

## 2022-01-11 NOTE — ED PROVIDER NOTES
History  Chief Complaint   Patient presents with    Abdominal Pain     pt c/o lower abd pain and high blood since yesterday  states her BP meds are late, usually takes atenolol   High Blood Pressure     Daryle Panda is a 51-year-old female with past medical history including hypertension and hyperlipidemia arriving to the emergency department for evaluation of elevated home blood pressure readings since yesterday  She states that yesterday she was generally not feeling well at which time she checked her blood pressure and noted this was elevated at 220/120 mmHg  The patient relates that she has been without her prescribed antihypertensive medications for greater than 1 week  The patient does not routinely check her blood pressure at home and is essentially unsure of her baseline  She states that she receives her anti-hypertensive medications from [facility], noting that her primary care provider is in [facility] and she had recently moved to the [facility] [place] to live with her daughter  She states that she has experienced waxing and waning frontal headaches, denying headaches that are sudden in onset or maximal intensity  She denies any vision changes, dizziness, lightheadedness, neck pain or stiffness, gait disturbances or imbalance  She also endorses complaint of central chest discomfort though she states that she had presumed that this was related to her gerd  She denies any shortness of breath, calf pain or leg swelling  She states that for the past week she has also been experiencing abdominal pain that appears to be localized to the suprapubic region, described as pressure  She states that she feels constipated with last normal bowel movement about 2 days ago  She denies n/v/d, dysuria, urgency, frequency, hematuria, or flank pain  Per review of EMR patient seen here on 12/6/21 for refill of her GERD medication  The patient offers no other complaints or concerns at this time            Prior to Admission Medications   Prescriptions Last Dose Informant Patient Reported? Taking? NIFEdipine (PROCARDIA) 10 mg capsule Past Week at Unknown time  Yes Yes   Sig: Take 10 mg by mouth 3 (three) times a day   atenolol (TENORMIN) 50 mg tablet Past Week at Unknown time  Yes Yes   Sig: Take 50 mg by mouth daily   pantoprazole (PROTONIX) 40 mg tablet   No No   Sig: Take 1 tablet (40 mg total) by mouth daily      Facility-Administered Medications: None       Past Medical History:   Diagnosis Date    Fibroids     Hyperlipidemia     Hypertension     Migraines        Past Surgical History:   Procedure Laterality Date    ABDOMINAL SURGERY      HYSTERECTOMY         Family History   Problem Relation Age of Onset    Ovarian cancer Mother    Rebeca Gilbert Breast cancer Sister 64    Hypertension Sister      I have reviewed and agree with the history as documented  E-Cigarette/Vaping     E-Cigarette/Vaping Substances     Social History     Tobacco Use    Smoking status: Never Smoker    Smokeless tobacco: Never Used   Substance Use Topics    Alcohol use: Yes     Comment: occasionally     Drug use: Never       Review of Systems   Constitutional: Negative for chills, diaphoresis, fatigue and fever  Respiratory: Negative for shortness of breath  Cardiovascular: Positive for chest pain  Negative for palpitations and leg swelling  Gastrointestinal: Positive for abdominal pain and constipation  Negative for abdominal distention, diarrhea, nausea and vomiting  Genitourinary: Negative for difficulty urinating and dysuria  Musculoskeletal: Negative for neck pain and neck stiffness  Neurological: Positive for headaches  Negative for dizziness, syncope, speech difficulty, weakness, light-headedness and numbness  All other systems reviewed and are negative  Physical Exam  Physical Exam  Vitals and nursing note reviewed  Constitutional:       General: She is not in acute distress  Appearance: Normal appearance  She is well-developed  She is obese  She is not ill-appearing, toxic-appearing or diaphoretic  HENT:      Head: Normocephalic and atraumatic  Right Ear: External ear normal       Left Ear: External ear normal    Eyes:      Conjunctiva/sclera: Conjunctivae normal    Cardiovascular:      Rate and Rhythm: Normal rate and regular rhythm  Pulses: Normal pulses  Pulmonary:      Effort: Pulmonary effort is normal  No respiratory distress  Breath sounds: Normal breath sounds  No wheezing, rhonchi or rales  Chest:      Chest wall: No tenderness  Abdominal:      General: There is no distension  Palpations: Abdomen is soft  Tenderness: There is generalized abdominal tenderness  There is no right CVA tenderness, left CVA tenderness, guarding or rebound  Comments: Abdomen soft, non-distended  Generalized abdominal tenderness to palpation, though most tender in the suprapubic region  No peritoneal signs  Musculoskeletal:         General: Normal range of motion  Cervical back: Normal range of motion and neck supple  Skin:     General: Skin is warm and dry  Capillary Refill: Capillary refill takes less than 2 seconds  Neurological:      General: No focal deficit present  Mental Status: She is alert  Sensory: Sensation is intact  Motor: Motor function is intact  No weakness  Coordination: Coordination is intact  Gait: Gait is intact   Gait normal       Comments: Patient ambulatory in exam room with stable and steady gait   Psychiatric:         Mood and Affect: Mood normal          Vital Signs  ED Triage Vitals   Temperature Pulse Respirations Blood Pressure SpO2   01/10/22 1715 01/10/22 1715 01/10/22 1715 01/10/22 1715 01/10/22 1715   97 8 °F (36 6 °C) 84 18 (!) 233/105 98 %      Temp Source Heart Rate Source Patient Position - Orthostatic VS BP Location FiO2 (%)   01/10/22 1715 01/10/22 1715 01/10/22 1715 01/10/22 1715 --   Tympanic Monitor Sitting Left arm       Pain Score       01/10/22 1951       4           Vitals:    01/10/22 1715 01/10/22 1951 01/10/22 2030   BP: (!) 233/105 (!) 233/111 (!) 222/105   Pulse: 84 77 72   Patient Position - Orthostatic VS: Sitting Sitting          Visual Acuity  Visual Acuity      Most Recent Value   L Pupil Size (mm) 3   R Pupil Size (mm) 3          ED Medications  Medications   sodium chloride 0 9 % bolus 1,000 mL (1,000 mL Intravenous New Bag 1/10/22 2109)   atenolol (TENORMIN) tablet 50 mg (50 mg Oral Given 1/10/22 2025)   NIFEdipine (PROCARDIA) capsule 10 mg (10 mg Oral Given 1/10/22 2025)       Diagnostic Studies  Results Reviewed     Procedure Component Value Units Date/Time    HS Troponin I 4hr [[id number]] Collected: 01/11/22 0505    Lab Status: Final result Specimen: Blood from Arm, Left Updated: 01/11/22 0544     hs TnI 4hr 13 ng/L      Delta 4hr hsTnI -3 ng/L     Basic metabolic panel [[id number]]  (Abnormal) Collected: 01/11/22 0505    Lab Status: Final result Specimen: Blood from Arm, Left Updated: 01/11/22 0531     Sodium 139 mmol/L      Potassium 3 2 mmol/L      Chloride 102 mmol/L      CO2 27 mmol/L      ANION GAP 10 mmol/L      BUN 8 mg/dL      Creatinine 0 96 mg/dL      Glucose 107 mg/dL      Calcium 8 5 mg/dL      eGFR 61 ml/min/1 73sq m     Narrative:      [place] guidelines for Chronic Kidney Disease (CKD):     Stage 1 with normal or high GFR (GFR > 90 mL/min/1 73 square meters)    Stage 2 Mild CKD (GFR = 60-89 mL/min/1 73 square meters)    Stage 3A Moderate CKD (GFR = 45-59 mL/min/1 73 square meters)    Stage 3B Moderate CKD (GFR = 30-44 mL/min/1 73 square meters)    Stage 4 Severe CKD (GFR = 15-29 mL/min/1 73 square meters)    Stage 5 End Stage CKD (GFR <15 mL/min/1 73 square meters)  Note: GFR calculation is accurate only with a steady state creatinine    CBC and differential [[id number]] Collected: 01/11/22 0505    Lab Status: Final result Specimen: Blood from Arm, Left Updated: 01/11/22 0521     WBC 8 94 Thousand/uL      RBC 4 31 Million/uL      Hemoglobin 12 8 g/dL      Hematocrit 38 9 %      MCV 90 fL      MCH 29 7 pg      MCHC 32 9 g/dL      RDW 12 2 %      MPV 9 3 fL      Platelets 611 Thousands/uL      nRBC 0 /100 WBCs      Neutrophils Relative 64 %      Immat GRANS % 0 %      Lymphocytes Relative 28 %      Monocytes Relative 7 %      Eosinophils Relative 1 %      Basophils Relative 0 %      Neutrophils Absolute 5 72 Thousands/µL      Immature Grans Absolute 0 02 Thousand/uL      Lymphocytes Absolute 2 54 Thousands/µL      Monocytes Absolute 0 59 Thousand/µL      Eosinophils Absolute 0 05 Thousand/µL      Basophils Absolute 0 02 Thousands/µL     HS Troponin I 2hr [[id number]] Collected: 01/10/22 2355    Lab Status: Final result Specimen: Blood from Arm, Left Updated: 01/11/22 0021     hs TnI 2hr 17 ng/L      Delta 2hr hsTnI 1 ng/L     HS Troponin 0hr (reflex protocol) [[id number]]  (Normal) Collected: 01/10/22 2124    Lab Status: Final result Specimen: Blood from Arm, Left Updated: 01/10/22 2151     hs TnI 0hr 16 ng/L     Lipase [[id number]]  (Normal) Collected: 01/10/22 2108    Lab Status: Final result Specimen: Blood from Arm, Left Updated: 01/10/22 2136     Lipase 132 u/L     Basic metabolic panel [[id number]]  (Abnormal) Collected: 01/10/22 2108    Lab Status: Final result Specimen: Blood from Arm, Left Updated: 01/10/22 2130     Sodium 138 mmol/L      Potassium 3 4 mmol/L      Chloride 100 mmol/L      CO2 28 mmol/L      ANION GAP 10 mmol/L      BUN 10 mg/dL      Creatinine 1 09 mg/dL      Glucose 102 mg/dL      Calcium 9 1 mg/dL      eGFR 53 ml/min/1 73sq m     Narrative:      [place] guidelines for Chronic Kidney Disease (CKD):     Stage 1 with normal or high GFR (GFR > 90 mL/min/1 73 square meters)    Stage 2 Mild CKD (GFR = 60-89 mL/min/1 73 square meters)    Stage 3A Moderate CKD (GFR = 45-59 mL/min/1 73 square meters)    Stage 3B Moderate CKD (GFR = 30-44 mL/min/1 73 square meters)    Stage 4 Severe CKD (GFR = 15-29 mL/min/1 73 square meters)    Stage 5 End Stage CKD (GFR <15 mL/min/1 73 square meters)  Note: GFR calculation is accurate only with a steady state creatinine    Hepatic function panel [[id number]]  (Abnormal) Collected: 01/10/22 2108    Lab Status: Final result Specimen: Blood from Arm, Left Updated: 01/10/22 2130     Total Bilirubin 0 97 mg/dL      Bilirubin, Direct 0 16 mg/dL      Alkaline Phosphatase 89 U/L      AST 17 U/L      ALT 25 U/L      Total Protein 9 0 g/dL      Albumin 4 1 g/dL     CBC and differential [[id number]] Collected: 01/10/22 2108    Lab Status: Final result Specimen: Blood from Arm, Left Updated: 01/10/22 2114     WBC 8 70 Thousand/uL      RBC 4 60 Million/uL      Hemoglobin 13 5 g/dL      Hematocrit 41 5 %      MCV 90 fL      MCH 29 3 pg      MCHC 32 5 g/dL      RDW 12 3 %      MPV 9 3 fL      Platelets 679 Thousands/uL      nRBC 0 /100 WBCs      Neutrophils Relative 51 %      Immat GRANS % 0 %      Lymphocytes Relative 41 %      Monocytes Relative 7 %      Eosinophils Relative 1 %      Basophils Relative 0 %      Neutrophils Absolute 4 45 Thousands/µL      Immature Grans Absolute 0 01 Thousand/uL      Lymphocytes Absolute 3 56 Thousands/µL      Monocytes Absolute 0 57 Thousand/µL      Eosinophils Absolute 0 08 Thousand/µL      Basophils Absolute 0 03 Thousands/µL     UA w Reflex to Microscopic w Reflex to Culture [[id number]] Collected: 01/10/22 2033    Lab Status: Final result Specimen: Urine, Clean Catch Updated: 01/10/22 2041     Color, UA Orange     Clarity, UA Clear     Specific Gravity, UA 1 025     pH, UA 5 5     Leukocytes, UA Negative     Nitrite, UA Negative     Protein, UA Negative mg/dl      Glucose, UA Negative mg/dl      Ketones, UA Negative mg/dl      Urobilinogen, UA 0 2 E U /dl      Bilirubin, UA Negative     Blood, UA Negative                 CT head without contrast   Final Result by Marifer Sanchez MD Kirk (01/10 2221)      No acute intracranial abnormality  Workstation performed: [id number]         CT abdomen pelvis with contrast   Final Result by Chelo Bagley MD (01/10 2252)      Mild circumferential wall thickening of the urinary bladder which may be related to underdistention or cystitis  Correlate with urinalysis  Workstation performed: [id number]                    Procedures  ECG 12 Lead Documentation Only    Date/Time: 1/10/2022 8:52 PM  Performed by: Keara Tidwell PA-C  Authorized by: Keara Tidwell PA-C     Indications / Diagnosis:  Malaise  Patient location:  ED  Previous ECG:     Previous ECG:  Compared to current    Comparison ECG info:  8/20/2019    Similarity:  No change  Interpretation:     Interpretation: abnormal    Rate:     ECG rate:  88    ECG rate assessment: normal    Rhythm:     Rhythm: sinus rhythm    Ectopy:     Ectopy: none    QRS:     QRS axis:  Normal  Conduction:     Conduction: normal    ST segments:     ST segments:  Non-specific  T waves:     T waves: inverted      Inverted:  II, III, aVF, V4, V5, V6 and V3  Comments:      T waves in I, avl from prior ECG resolved  T wave inversions in V3-V6 seen on prior  ED Course                                             MDM  Number of Diagnoses or Management Options  Abnormal ECG  Chest pain, unspecified: new and requires workup  Hypertensive crisis: new and requires workup  Diagnosis management comments: This is a 59-year-old female with past medical history of hypertension and hyperlipidemia presenting with complaints of headache, chest discomfort, and abdominal pain x1 week  Patient is significantly hypertensive on presentation, noting that she has been without her antihypertensive medications for greater than 1 week  She admits to feeling generally unwell yesterday and had checked her blood pressure at that time with readings of 220/120 mmHg    Reports waxing and waning frontal headaches that are not sudden in onset of maximal intensity  She has no focal deficits  She has not tried anything for symptom alleviation thus far  Differential diagnosis includes but is not limited to: acs screening, imaging to assess for acute intracranial abnormality or acute intra-abdominal pathology, hypertensive emergency, hypertensive urgency, anemia, electrolyte derangements; assess for evidence of end-organ dysfunction in setting of elevated blood pressure    Initial ED plan: ECG, labs, CT, will administer anti-hypertensives and continue to monitor blood pressure    Final ED Assessment: Vital signs on ED presentation notable for severely elevated blood pressure, examination as above  All labs and imaging independently reviewed with imaging interpreted by the Radiologist   ECG notable for T-wave inversions in the inferior leads, which is new compared to prior ECG  HS troponin 16  CT head reports no acute intracranial abnormality; CT abd/pelvis reports bladder wall thickening that may represent cystitis though UA is unremarkable  Given the patient's blood pressure at presentation, abnormal EKG, and active chest pain I had recommended hospital admission for telemetry monitoring and further management per the hospitalist service which the patient is understanding and agreeable with  I have discussed the case with Becca DIAZ , patient accepted to the medicine service  Bridging orders placed  I had spoke with patient's daughter to provide an update of care plan, and had been made aware that the patient has actually been without her hypertensives for about 1 month  The patient's blood pressure had responded nicely after she had received her medications at her prescribed dosages  Patient stable for admission at this time, bridging orders placed         Amount and/or Complexity of Data Reviewed  Clinical lab tests: ordered and reviewed  Tests in the radiology section of CPT®: ordered and reviewed  Review and summarize past medical records: yes  Independent visualization of images, tracings, or specimens: yes    Risk of Complications, Morbidity, and/or Mortality  Presenting problems: moderate  Diagnostic procedures: moderate  Management options: moderate    Patient Progress  Patient progress: stable      Disposition  Final diagnoses:   Hypertensive crisis   Chest pain, unspecified   Abnormal ECG     Time reflects when diagnosis was documented in both MDM as applicable and the Disposition within this note     Time User Action Codes Description Comment    1/10/2022 11:18 PM Kaleta Pierini Add [I16 9] Hypertensive crisis     1/10/2022 11:18 PM Kaleta Pierini Add [R07 9] Chest pain, unspecified     1/10/2022 11:19 PM Kaleta Pierini Add [R94 31] Abnormal ECG       ED Disposition     ED Disposition Condition Date/Time Comment    Admit Stable Mon James 10, 2022 11:14 PM Case was discussed with Ghulam Jacobs and the patient's admission status was agreed to be Admission Status: observation status to the service of Dr Char Gaffney   Follow-up Information    None         Patient's Medications   Discharge Prescriptions    No medications on file       No discharge procedures on file      PDMP Review     None          ED Provider  Electronically Signed by           Charley Hernandez PA-C  01/12/22 4871

## 2022-01-11 NOTE — CASE MANAGEMENT
Case Management Assessment & Discharge Planning Note    Patient name Silvestre Ontiveros  Location ED 24/ED 24 MRN [id number]  : 1955 Date 2022       Current Admission Date: 1/10/2022  Current Admission Diagnosis:Hypertensive urgency   Patient Active Problem List    Diagnosis Date Noted    Hypertensive urgency 01/10/2022    Abdominal pain 01/10/2022    Hyperlipidemia     Chest pain       LOS (days): 0  Geometric Mean LOS (GMLOS) (days):   Days to GMLOS:     OBJECTIVE:        Bundled Patient Payment:  (no)     Current admission status: Observation       Preferred Pharmacy:   [address] [place], [place] [id number] R R [phone] R R [phone])  [address]  Phone: [phone] Fax: [phone] - [address]  [address] [facility] [address]  Phone: [phone] Fax: [phone]    Primary Care Provider: No primary care provider on file  Primary Insurance:   Secondary Insurance:     ASSESSMENT:  Active Health Care Agents    There are no active Health Care Agents on file  Advance Directives  Does patient have a [address]?: No  Was patient offered paperwork?: Yes  Does patient currently have a Health Care decision maker?: No  Does patient have Advance Directives?: Yes  Advance Directives: Living will         Readmission Root Cause  30 Day Readmission: No    Patient Information  Admitted from[de-identified] Home  Mental Status: Alert  During Assessment patient was accompanied by: Not accompanied during assessment (assessment w pt and daughter)  Assessment information provided by[de-identified] Daughter  Primary Caregiver: Self  Support Systems: Daughter  [place] of Residence: [place] [address] do you live in?: [place]  Home entry access options   Select all that apply : Stairs  Number of steps to enter home : 5  Do the steps have railings?: Yes  Type of Current Residence: [address] [place] home  Upon entering residence, is there a bedroom on the main floor (no further steps)?: Yes  Upon entering residence, is there a bathroom on the main floor (no further steps)?: Yes (needs to go up a flight to shower)  In the last 12 months, was there a time when you were not able to pay the mortgage or rent on time?: No  In the last 12 months, how many places have you lived?: 3  In the last 12 months, was there a time when you did not have a steady place to sleep or slept in a shelter (including now)?: No  Homeless/housing insecurity resource given?: No  Living Arrangements: Lives w/ Daughter (came from [facility] w her daughter in a home in [facility] and also in a home in [place])  Is patient a ?: No    Activities of Daily Living Prior to Admission  Functional Status: Independent  Completes ADLs independently?: Yes  Ambulates independently?: Yes  Does patient use assisted devices?: No  Does patient currently own DME?: No  Does patient have a history of Outpatient Therapy (PT/OT)?: No  Does the patient have a history of Short-Term Rehab?: No  Does patient have a history of HHC?: No  Does patient currently have [facility]?: No         Patient Information Continued  Income Source: Pension/MCC  Does patient have prescription coverage?: Yes (in [place])  Within the past 12 months, you worried that your food would run out before you got the money to buy more : Never true  Within the past 12 months, the food you bought just didnt last and you didnt have money to get more : Never true  Food insecurity resource given?: No  Does patient receive dialysis treatments?: No  Does patient have a history of substance abuse?: No  Does patient have a history of Mental Health Diagnosis?: No         Means of Transportation  Means of Transport to Appts[de-identified] Family transport  In the past 12 months, has lack of transportation kept you from medical appointments or from getting medications?: No  In the past 12 months, has lack of transportation kept you from meetings, work, or from getting things needed for daily living?: No  Was application for public transport provided?: No        DISCHARGE DETAILS:    Discharge planning discussed with[de-identified] pt daughter cannot get to pharmacy until tomorrow morning due to ice  per MD pt will need nifedipine prior to d c RN will give at 3p   lyft set up for 4p  waiver on file  all parties notified of details  daughter pref for [facility]--MD updated  [place] of Choice: Yes     CM contacted family/caregiver?: Yes  Were Treatment Team discharge recommendations reviewed with patient/caregiver?: Yes  Did patient/caregiver verbalize understanding of patient care needs?: Yes  Were patient/caregiver advised of the risks associated with not following Treatment Team discharge recommendations?: Yes         Requested [address] [employer] [facility] [address]         Is the patient interested in [facility] at discharge?: No    DME Referral Provided  Referral made for DME?: No    Other Referral/Resources/Interventions Provided:  Financial Resources Provided:  (ins card emailed to business office   pt new RX, daughter can afford at [facility]  ins will not cover as staying in [place])    Would you like to participate in our [address] service program?  : No - Declined       Discharge Destination Plan[de-identified] Home

## 2022-01-11 NOTE — ASSESSMENT & PLAN NOTE
· Reporting suprapubic abdominal pressure since yesterday, and constipation  · Denies urinary complaints, hematuria, or flank pain  · CT ABD/pelvis showing bladder thickening suggestive of possible cystitis  · UA negative for UTI   · Afebrile; no leukocytosis; lipase WNL

## 2022-01-11 NOTE — ED NOTES
Pt difficult IV stick/lab draw   Another RN to be bedside shortly to attempt lab draw via [address] [place] [address],[address]  01/10/22 2055

## 2022-01-11 NOTE — UTILIZATION REVIEW
Initial Clinical Review    Admission: Date/Time/Statement:   Admission Orders (From admission, onward)     Ordered        01/10/22 2317  Place in Observation  Once                      Orders Placed This Encounter   Procedures    Place in Observation     Standing Status:   Standing     Number of Occurrences:   1     Order Specific Question:   Level of Care     Answer:   Med Surg [16]     ED Arrival Information     Expected Arrival Acuity    - 1/10/2022 17:01 Urgent         Means of arrival Escorted by Service Admission type    Walk-In Family Member Hospitalist Urgent         Arrival complaint    Hypertension        Chief Complaint   Patient presents with    Abdominal Pain     pt c/o lower abd pain and high blood since yesterday  states her BP meds are late, usually takes atenolol   High Blood Pressure       Initial Presentation: 10 yo female to ED from home w/ abd pain and HTN   Ran out of home BP meds 1 week ago   Developed HA , suprapubic abd pain and substernal chest discomfort and noticed BP was 220/110 at home   BP was 233/111 in ED given atenolol and nifedipine   CT head neg   Admitted OBS status for tele , monitor BP , consider cardiology consult in am   Pt c/o abd pain , CT w suggestive of possible cystitis , ua neg UTI        PE: abd tenderness     ED Triage Vitals   Temperature Pulse Respirations Blood Pressure SpO2   01/10/22 1715 01/10/22 1715 01/10/22 1715 01/10/22 1715 01/10/22 1715   97 8 °F (36 6 °C) 84 18 (!) 233/105 98 %      Temp Source Heart Rate Source Patient Position - Orthostatic VS BP Location FiO2 (%)   01/10/22 1715 01/10/22 1715 01/10/22 1715 01/10/22 1715 --   Tympanic Monitor Sitting Left arm       Pain Score       01/10/22 1951       4          Wt Readings from Last 1 Encounters:   01/11/22 92 2 kg (203 lb 4 2 oz)     Additional Vital Signs:   01/11/22 0505 98 °F (36 7 °C) 65 18 162/76 -- 100 % None (Room air) Lying   01/10/22 2235 -- 78 20 147/71 -- 99 % None (Room air) Lying 01/10/22 2030 -- 72 20 222/105 Abnormal  150 98 % None (Room air) --   01/10/22 1951 -- 77 18 233/111 Abnormal  -- 99 % None (Room air) Sitting       Pertinent Labs/Diagnostic Test Results:   1/10 CT head   No acute intracranial abnormality            1/10 CT abd Mild circumferential wall thickening of the urinary bladder which may be related to underdistention or cystitis    Correlate with urinalysis  · 1/10 EKG EKG: SR HR88 w/inferolateral T-wave inversions    Results from last 7 days   Lab Units 01/11/22  0505 01/10/22  2108   WBC Thousand/uL 8 94 8 70   HEMOGLOBIN g/dL 12 8 13 5   HEMATOCRIT % 38 9 41 5   PLATELETS Thousands/uL 309 312   NEUTROS ABS Thousands/µL 5 72 4 45     Results from last 7 days   Lab Units 01/11/22  0505 01/10/22  2108   SODIUM mmol/L 139 138   POTASSIUM mmol/L 3 2* 3 4*   CHLORIDE mmol/L 102 100   CO2 mmol/L 27 28   ANION GAP mmol/L 10 10   BUN mg/dL 8 10   CREATININE mg/dL 0 96 1 09   EGFR ml/min/1 73sq m 61 53   CALCIUM mg/dL 8 5 9 1     Results from last 7 days   Lab Units 01/10/22  2108   AST U/L 17   ALT U/L 25   ALK PHOS U/L 89   TOTAL PROTEIN g/dL 9 0*   ALBUMIN g/dL 4 1   TOTAL BILIRUBIN mg/dL 0 97   BILIRUBIN DIRECT mg/dL 0 16     Results from last 7 days   Lab Units 01/11/22  0505 01/10/22  2108   GLUCOSE RANDOM mg/dL 107 102     Results from last 7 days   Lab Units 01/11/22  0505 01/10/22  2355 01/10/22  2124   HS TNI 0HR ng/L  --   --  16   HS TNI 2HR ng/L  --  17  --    HSTNI D2 ng/L  --  1  --    HS TNI 4HR ng/L 13  --   --    HSTNI D4 ng/L -3  --   --      Results from last 7 days   Lab Units 01/10/22  2108   LIPASE u/L 132     Results from last 7 days   Lab Units 01/10/22  2033   CLARITY UA  Clear   COLOR UA  [place]   SPEC GRAV UA  1 025   PH UA  5 5   GLUCOSE UA mg/dl Negative   KETONES UA mg/dl Negative   BLOOD UA  Negative   PROTEIN UA mg/dl Negative   NITRITE UA  Negative   BILIRUBIN UA  Negative   UROBILINOGEN UA E U /dl 0 2   LEUKOCYTES UA  Negative     ED Treatment:   Medication Administration from 01/10/2022 1701 to 01/11/2022 0731       Date/Time Order Dose Route Action     01/10/2022 2109 sodium chloride 0 9 % bolus 1,000 mL 1,000 mL Intravenous New Bag     01/10/2022 2025 atenolol (TENORMIN) tablet 50 mg 50 mg Oral Given     01/10/2022 2025 NIFEdipine (PROCARDIA) capsule 10 mg 10 mg Oral Given     01/11/2022 0002 aspirin tablet 325 mg 325 mg Oral Given     01/11/2022 0024 atenolol (TENORMIN) tablet 50 mg 50 mg Oral Given     01/11/2022 0025 NIFEdipine (PROCARDIA) capsule 10 mg 10 mg Oral Given     01/11/2022 0512 hydrALAZINE (APRESOLINE) injection 5 mg 5 mg Intravenous Given        Past Medical History:   Diagnosis Date    Fibroids     Hyperlipidemia     Hypertension     Migraines      Present on Admission:  **None**      Admitting Diagnosis: Abdominal pain [R10 9]  High blood pressure [I10]  Age/Sex: 77 y o  female  Admission Orders:  Scheduled Medications:  atenolol, 50 mg, Oral, Daily  enoxaparin, 40 mg, Subcutaneous, Daily  NIFEdipine, 10 mg, Oral, TID      Continuous IV Infusions:     PRN Meds:  acetaminophen, 650 mg, Oral, Q6H PRN  hydrALAZINE, 5 mg, Intravenous, Q6H PRN  1/11  x1    Tele         Network Utilization Review Department  ATTENTION: Please call with any questions or concerns to [phone] and carefully listen to the prompts so that you are directed to the right person  All voicemails are confidential   Beena Wright all requests for admission clinical reviews, approved or denied determinations and any other requests to dedicated fax number below belonging to the campus where the patient is receiving treatment   List of dedicated fax numbers for the Facilities:  [address] DENIALS (Administrative/Medical Necessity) [phone]   [address] (Maternity/NICU/Pediatrics) [phone] [address] [place] [address] 86[address] [facility] [address] [facility] [phone]   [address] [place] [address] [place] [address] [address] [place] [facility] [address] [facility] [phone]

## 2022-01-12 VITALS
WEIGHT: 203.26 LBS | TEMPERATURE: 98 F | DIASTOLIC BLOOD PRESSURE: 78 MMHG | SYSTOLIC BLOOD PRESSURE: 160 MMHG | RESPIRATION RATE: 18 BRPM | OXYGEN SATURATION: 100 % | HEART RATE: 68 BPM | HEIGHT: 66 IN | BODY MASS INDEX: 32.67 KG/M2

## 2022-01-12 LAB
ATRIAL RATE: 76 BPM
ATRIAL RATE: 88 BPM
P AXIS: 73 DEGREES
P AXIS: 74 DEGREES
PR INTERVAL: 166 MS
PR INTERVAL: 174 MS
QRS AXIS: 61 DEGREES
QRS AXIS: 62 DEGREES
QRSD INTERVAL: 74 MS
QRSD INTERVAL: 78 MS
QT INTERVAL: 372 MS
QT INTERVAL: 396 MS
QTC INTERVAL: 445 MS
QTC INTERVAL: 450 MS
T WAVE AXIS: -14 DEGREES
T WAVE AXIS: -52 DEGREES
VENTRICULAR RATE: 76 BPM
VENTRICULAR RATE: 88 BPM

## 2022-01-12 PROCEDURE — 99217 PR OBSERVATION CARE DISCHARGE MANAGEMENT: CPT | Performed by: FAMILY MEDICINE

## 2022-01-12 PROCEDURE — 93010 ELECTROCARDIOGRAM REPORT: CPT | Performed by: INTERNAL MEDICINE

## 2022-01-12 RX ADMIN — NIFEDIPINE 10 MG: 10 CAPSULE ORAL at 08:39

## 2022-01-12 RX ADMIN — ATENOLOL 50 MG: 50 TABLET ORAL at 08:40

## 2022-01-12 RX ADMIN — ENOXAPARIN SODIUM 40 MG: 40 INJECTION SUBCUTANEOUS at 08:40

## 2022-01-12 NOTE — ASSESSMENT & PLAN NOTE
42-year-old lady with underlying history of hypertension maintained on atenolol/nifedipine typically but [place] has been out of her medication for almost 1 week now  She presented to the ED with symptoms of frontal headache/substernal chest discomfort and elevated blood pressure readings at home  · Initial blood pressure reading in the /105  · Patient received her home medications atenolol/nifedipine in the ER following which her blood pressure readings have been in the 693P - 009P systolic  · Continue atenolol/nifedipine (new prescriptionsWritten)  ·  on board to ensure patient can afford these medications  · Please note CT head negative for any acute findings  · Discussed with patient importance of compliance at discharge  · She needs outpatient follow-up with PCP in 1 week

## 2022-01-12 NOTE — UTILIZATION REVIEW
Continued Stay Review    Date: 1/12                          Current Patient Class: IP   Current Level of Care:     HPI:66 y o  female initially admitted on  1/10 for HTN     Assessment/Plan:     Vital Signs:     Pertinent Labs/Diagnostic Results:       Results from last 7 days   Lab Units 01/11/22  0505 01/10/22  2108   WBC Thousand/uL 8 94 8 70   HEMOGLOBIN g/dL 12 8 13 5   HEMATOCRIT % 38 9 41 5   PLATELETS Thousands/uL 309 312   NEUTROS ABS Thousands/µL 5 72 4 45     Results from last 7 days   Lab Units 01/11/22  0505 01/10/22  2108   SODIUM mmol/L 139 138   POTASSIUM mmol/L 3 2* 3 4*   CHLORIDE mmol/L 102 100   CO2 mmol/L 27 28   ANION GAP mmol/L 10 10   BUN mg/dL 8 10   CREATININE mg/dL 0 96 1 09   EGFR ml/min/1 73sq m 61 53   CALCIUM mg/dL 8 5 9 1     Results from last 7 days   Lab Units 01/10/22  2108   AST U/L 17   ALT U/L 25   ALK PHOS U/L 89   TOTAL PROTEIN g/dL 9 0*   ALBUMIN g/dL 4 1   TOTAL BILIRUBIN mg/dL 0 97   BILIRUBIN DIRECT mg/dL 0 16     Results from last 7 days   Lab Units 01/11/22  0505 01/10/22  2108   GLUCOSE RANDOM mg/dL 107 102     Results from last 7 days   Lab Units 01/11/22  0505 01/10/22  2355 01/10/22  2124   HS TNI 0HR ng/L  --   --  16   HS TNI 2HR ng/L  --  17  --    HSTNI D2 ng/L  --  1  --    HS TNI 4HR ng/L 13  --   --    HSTNI D4 ng/L -3  --   --      Results from last 7 days   Lab Units 01/10/22  2108   LIPASE u/L 132     Results from last 7 days   Lab Units 01/10/22  2033   CLARITY UA  Clear   COLOR UA  [place]   SPEC GRAV UA  1 025   PH UA  5 5   GLUCOSE UA mg/dl Negative   KETONES UA mg/dl Negative   BLOOD UA  Negative   PROTEIN UA mg/dl Negative   NITRITE UA  Negative   BILIRUBIN UA  Negative   UROBILINOGEN UA E U /dl 0 2   LEUKOCYTES UA  Negative         Medications:   Scheduled Medications:  atenolol, 50 mg, Oral, Daily  enoxaparin, 40 mg, Subcutaneous, Daily  NIFEdipine, 10 mg, Oral, TID      Continuous IV Infusions:     PRN Meds:  acetaminophen, 650 mg, Oral, Q6H PRN  hydrALAZINE, 5 mg, Intravenous, Q6H PRN        Discharge Plan: TBD    Network Utilization Review Department  ATTENTION: Please call with any questions or concerns to [phone] and carefully listen to the prompts so that you are directed to the right person  All voicemails are confidential   Mariposa Lopez all requests for admission clinical reviews, approved or denied determinations and any other requests to dedicated fax number below belonging to the campus where the patient is receiving treatment   List of dedicated fax numbers for the Facilities:  [address] DENIALS (Administrative/Medical Necessity) [phone]   [address] (Maternity/NICU/Pediatrics) [phone] [address]  [address] [place] [address] [place] [address] [place] [address] [place] [address] [facility] [place] [address] [phone]

## 2022-01-12 NOTE — PLAN OF CARE
Problem: SAFETY ADULT  Goal: Patient will remain free of falls  Description: INTERVENTIONS:  - Educate patient/family on patient safety including physical limitations  - Instruct patient to call for assistance with activity   - Consult OT/PT to assist with strengthening/mobility   - Keep Call bell within reach  - Keep bed low and locked with side rails adjusted as appropriate  - Keep care items and personal belongings within reach  - Initiate and maintain comfort rounds  - Make Fall Risk Sign visible to staff  - Offer Toileting every 2 Hours, in advance of need  - Initiate/Maintain  per shift alarm  - Obtain necessary fall risk management equipment: as needed   - Apply yellow socks and bracelet for high fall risk patients  - Consider moving patient to room near nurses station  Outcome: Adequate for Discharge

## 2022-01-12 NOTE — DISCHARGE SUMMARY
[address] [facility]  Discharge- Annalee Client 1955, 77 y o  female MRN: [id number]  Unit/Bed#: ED 24 Encounter: [id number]  Primary Care Provider: No primary care provider on file  Date and time admitted to hospital: 1/10/2022  7:38 PM    * Hypertensive urgency  Assessment & Plan  79-year-old lady with underlying history of hypertension maintained on atenolol/nifedipine typically but [place] with [place] has been out of her medication for almost 1 week now  She presented to the ED with symptoms of frontal headache/substernal chest discomfort and elevated blood pressure readings at home  · Initial blood pressure reading in the /105  · Patient received her home medications atenolol/nifedipine in the ER following which her blood pressure readings have been in the 260F - 049T systolic  · Continue atenolol/nifedipine (new prescriptionsWritten)  ·  on board to ensure patient can afford these medications  · Please note CT head negative for any acute findings  · Discussed with patient importance of compliance at discharge  · She needs outpatient follow-up with PCP in 1 week  Chest pain  Assessment & Plan  Patient reported mild nonradiating substernal chest discomfort not affected with activity not associated with shortness of breath or nausea vomiting diaphoresis  · Likely related to hypertensive urgency  · Currently asymptomatic  · Negative troponin readings  · No events noted on telemetry  · Discussed importance of medication compliance  Hyperlipidemia  Assessment & Plan  · Reports is not currently taking outpatient statin medication  · Needs outpatient follow-up with PCP  Abdominal pain  Assessment & Plan  · CT abdomen pelvis negative for any acute findings besides bladder thickening? Cystitis although UA negative  · Patient does report some constipation for which she has been started on Colace    · This morning she denies any symptoms of abdominal pain  Discharging Physician / Practitioner: Lisa Wyatt MD  PCP: No primary care provider on file  Admission Date:   Admission Orders (From admission, onward)     Ordered        01/10/22 8098  Place in Observation  Once                      Discharge Date: 01/12/22    Disposition:      · home    Reason for Admission: headache/ elevated BP    Discharge Diagnoses:     Please see assessment and plan section above for further details regarding discharge diagnoses  Medical Problems             Resolved Problems  Date Reviewed: 1/10/2022    None              Medication Adjustments and Discharge Medications:  · Summary of Medication Adjustments made as a result of this hospitalization: see med rec  · Medication Dosing Tapers - Please refer to Discharge Medication List for details on any medication dosing tapers (if applicable to patient)  · Medications being temporarily held (include recommended restart time): see med rec  · Discharge Medication List: See after visit summary for reconciled discharge medications  Diet Recommendations at Discharge:  Diet -        Diet Orders   (From admission, onward)             Start     Ordered    01/10/22 1368  Diet Cardiovascular; Cardiac; No Chocolate, No Caffeine  Diet effective now        References:    Nutrtion Support Algorithm Enteral vs  Parenteral   Question Answer Comment   Diet Type Cardiovascular    Cardiac Cardiac    Other Restriction(s): No Chocolate    Other Restriction(s): No Caffeine    RD to adjust diet per protocol? Yes        01/10/22 2413                Hospital Course:   Ed Vergara is a 77 y o  female patient who originally presented to the hospital on 1/10/2022 due to complaints of elevated blood pressure readings at home  [place] and has run out for blood pressure medications approximately 1 week ago    She presented to the ER with symptoms of frontal headache/suprapubic abdominal pain/substernal chest discomfort and noticing her blood pressure being 220/110 at home      · Hypertensive urgency, initial blood pressure reading in the /105  She received her home medications atenolol/nifedipine in the ER following which her blood pressure readings have been in the 858 systolic  New prescriptions written for the same medication and  on board to ensure patient can afford these medications  Discussed with patient regarding compliance  She needs outpatient follow-up with PCP in 1 week  · For her chest pain/abdominal pain, CT head negative for acute finding/CT abdomen pelvis negative for any acute findings  Troponin trend negative/telemetry no events noted  UA negative  Likely related to hypertensive urgency now symptoms resolved  · Patient needs close outpatient follow-up with PCP  Patient agreeable with plan above  Patient being discharged home  Condition at Discharge: good     Discharge Day Visit / Exam:     Vitals: Blood Pressure: 160/78 (01/12/22 0839)  Pulse: 68 (01/12/22 0840)  Temperature: 98 °F (36 7 °C) (01/11/22 0505)  Temp Source: Tympanic (01/10/22 1715)  Respirations: 18 (01/12/22 0751)  Height: 5' 6" (167 6 cm) (01/11/22 0505)  Weight - Scale: 92 2 kg (203 lb 4 2 oz) (01/11/22 0505)  SpO2: 100 % (01/12/22 0751)  Exam:   Physical Exam  Constitutional:       General: She is not in acute distress  Appearance: She is obese  She is not toxic-appearing  HENT:      Mouth/Throat:      Mouth: Mucous membranes are moist       Pharynx: Oropharynx is clear  Cardiovascular:      Rate and Rhythm: Normal rate and regular rhythm  Pulses: Normal pulses  Pulmonary:      Effort: Pulmonary effort is normal  No respiratory distress  Breath sounds: Normal breath sounds  No wheezing  Abdominal:      General: Abdomen is flat  Bowel sounds are normal       Palpations: Abdomen is soft     Neurological:      General: No focal deficit present  Mental Status: She is alert and oriented to person, place, and time  Goals of Care Discussions:  · Code Status at Discharge: Level 1 - Full Code    Discharge instructions/Information to patient and family:   See after visit summary section titled Discharge Instructions for information provided to patient and family  Discharge Statement:  I spent 40 minutes discharging the patient  This time was spent on the day of discharge  I had direct contact with the patient on the day of discharge  Greater than 50% of the total time was spent examining patient, answering all patient questions, arranging and discussing plan of care with patient as well as directly providing post-discharge instructions  Additional time then spent on discharge activities      ** Please Note: This note has been constructed using a voice recognition system **

## 2022-01-12 NOTE — ASSESSMENT & PLAN NOTE
· Reports is not currently taking outpatient statin medication  · Needs outpatient follow-up with PCP

## 2023-11-21 ENCOUNTER — APPOINTMENT (EMERGENCY)
Dept: RADIOLOGY | Facility: HOSPITAL | Age: 68
End: 2023-11-21
Payer: COMMERCIAL

## 2023-11-21 ENCOUNTER — HOSPITAL ENCOUNTER (EMERGENCY)
Facility: HOSPITAL | Age: 68
Discharge: HOME | End: 2023-11-21
Attending: EMERGENCY MEDICINE
Payer: COMMERCIAL

## 2023-11-21 VITALS
RESPIRATION RATE: 14 BRPM | OXYGEN SATURATION: 97 % | SYSTOLIC BLOOD PRESSURE: 164 MMHG | TEMPERATURE: 98.1 F | HEART RATE: 65 BPM | BODY MASS INDEX: 32.02 KG/M2 | DIASTOLIC BLOOD PRESSURE: 73 MMHG | WEIGHT: 198.41 LBS

## 2023-11-21 DIAGNOSIS — R07.89 CHEST WALL PAIN: Primary | ICD-10-CM

## 2023-11-21 DIAGNOSIS — I10 HYPERTENSION: ICD-10-CM

## 2023-11-21 DIAGNOSIS — K21.00 REFLUX ESOPHAGITIS: ICD-10-CM

## 2023-11-21 LAB
ANION GAP SERPL CALCULATED.3IONS-SCNC: 7 MMOL/L
ATRIAL RATE: 74 BPM
BASOPHILS # BLD AUTO: 0.02 THOUSANDS/ÂΜL (ref 0–0.1)
BASOPHILS NFR BLD AUTO: 0 % (ref 0–1)
BUN SERPL-MCNC: 13 MG/DL (ref 5–25)
CALCIUM SERPL-MCNC: 9.1 MG/DL (ref 8.4–10.2)
CARDIAC TROPONIN I PNL SERPL HS: 5 NG/L
CHLORIDE SERPL-SCNC: 106 MMOL/L (ref 96–108)
CO2 SERPL-SCNC: 27 MMOL/L (ref 21–32)
CREAT SERPL-MCNC: 0.97 MG/DL (ref 0.6–1.3)
EOSINOPHIL # BLD AUTO: 0.04 THOUSAND/ÂΜL (ref 0–0.61)
EOSINOPHIL NFR BLD AUTO: 1 % (ref 0–6)
ERYTHROCYTE [DISTWIDTH] IN BLOOD BY AUTOMATED COUNT: 12.5 % (ref 11.6–15.1)
GFR SERPL CREATININE-BSD FRML MDRD: 60 ML/MIN/1.73SQ M
GLUCOSE SERPL-MCNC: 86 MG/DL (ref 65–140)
HCT VFR BLD AUTO: 35.3 % (ref 34.8–46.1)
HGB BLD-MCNC: 11.5 G/DL (ref 11.5–15.4)
IMM GRANULOCYTES # BLD AUTO: 0 THOUSAND/UL (ref 0–0.2)
IMM GRANULOCYTES NFR BLD AUTO: 0 % (ref 0–2)
LYMPHOCYTES # BLD AUTO: 2.37 THOUSANDS/ÂΜL (ref 0.6–4.47)
LYMPHOCYTES NFR BLD AUTO: 44 % (ref 14–44)
MCH RBC QN AUTO: 29.9 PG (ref 26.8–34.3)
MCHC RBC AUTO-ENTMCNC: 32.6 G/DL (ref 31.4–37.4)
MCV RBC AUTO: 92 FL (ref 82–98)
MONOCYTES # BLD AUTO: 0.42 THOUSAND/ÂΜL (ref 0.17–1.22)
MONOCYTES NFR BLD AUTO: 8 % (ref 4–12)
NEUTROPHILS # BLD AUTO: 2.58 THOUSANDS/ÂΜL (ref 1.85–7.62)
NEUTS SEG NFR BLD AUTO: 47 % (ref 43–75)
NRBC BLD AUTO-RTO: 0 /100 WBCS
P AXIS: 76 DEGREES
PLATELET # BLD AUTO: 295 THOUSANDS/UL (ref 149–390)
PMV BLD AUTO: 10 FL (ref 8.9–12.7)
POTASSIUM SERPL-SCNC: 3.4 MMOL/L (ref 3.5–5.3)
PR INTERVAL: 174 MS
QRS AXIS: 61 DEGREES
QRSD INTERVAL: 76 MS
QT INTERVAL: 370 MS
QTC INTERVAL: 410 MS
RBC # BLD AUTO: 3.85 MILLION/UL (ref 3.81–5.12)
SODIUM SERPL-SCNC: 140 MMOL/L (ref 135–147)
T WAVE AXIS: 24 DEGREES
VENTRICULAR RATE: 74 BPM
WBC # BLD AUTO: 5.43 THOUSAND/UL (ref 4.31–10.16)

## 2023-11-21 PROCEDURE — 99285 EMERGENCY DEPT VISIT HI MDM: CPT | Performed by: EMERGENCY MEDICINE

## 2023-11-21 PROCEDURE — 93010 ELECTROCARDIOGRAM REPORT: CPT | Performed by: INTERNAL MEDICINE

## 2023-11-21 PROCEDURE — 36415 COLL VENOUS BLD VENIPUNCTURE: CPT | Performed by: EMERGENCY MEDICINE

## 2023-11-21 PROCEDURE — 96374 THER/PROPH/DIAG INJ IV PUSH: CPT

## 2023-11-21 PROCEDURE — 96375 TX/PRO/DX INJ NEW DRUG ADDON: CPT

## 2023-11-21 PROCEDURE — 99285 EMERGENCY DEPT VISIT HI MDM: CPT

## 2023-11-21 PROCEDURE — 84484 ASSAY OF TROPONIN QUANT: CPT | Performed by: EMERGENCY MEDICINE

## 2023-11-21 PROCEDURE — 80048 BASIC METABOLIC PNL TOTAL CA: CPT | Performed by: EMERGENCY MEDICINE

## 2023-11-21 PROCEDURE — 85025 COMPLETE CBC W/AUTO DIFF WBC: CPT | Performed by: EMERGENCY MEDICINE

## 2023-11-21 PROCEDURE — 93005 ELECTROCARDIOGRAM TRACING: CPT

## 2023-11-21 PROCEDURE — 71045 X-RAY EXAM CHEST 1 VIEW: CPT

## 2023-11-21 PROCEDURE — 71101 X-RAY EXAM UNILAT RIBS/CHEST: CPT

## 2023-11-21 RX ORDER — KETOROLAC TROMETHAMINE 30 MG/ML
15 INJECTION, SOLUTION INTRAMUSCULAR; INTRAVENOUS ONCE
Status: COMPLETED | OUTPATIENT
Start: 2023-11-21 | End: 2023-11-21

## 2023-11-21 RX ORDER — PREDNISONE 50 MG/1
50 TABLET ORAL DAILY
Qty: 5 TABLET | Refills: 0 | Status: SHIPPED | OUTPATIENT
Start: 2023-11-21

## 2023-11-21 RX ORDER — SODIUM CHLORIDE 9 MG/ML
3 INJECTION INTRAVENOUS
Status: DISCONTINUED | OUTPATIENT
Start: 2023-11-21 | End: 2023-11-21 | Stop reason: HOSPADM

## 2023-11-21 RX ORDER — PANTOPRAZOLE SODIUM 20 MG/1
40 TABLET, DELAYED RELEASE ORAL DAILY
Qty: 30 TABLET | Refills: 1 | Status: SHIPPED | OUTPATIENT
Start: 2023-11-21

## 2023-11-21 RX ORDER — CYCLOBENZAPRINE HCL 10 MG
10 TABLET ORAL ONCE
Status: COMPLETED | OUTPATIENT
Start: 2023-11-21 | End: 2023-11-21

## 2023-11-21 RX ORDER — KETOROLAC TROMETHAMINE 10 MG/1
10 TABLET, FILM COATED ORAL ONCE
Status: COMPLETED | OUTPATIENT
Start: 2023-11-21 | End: 2023-11-21

## 2023-11-21 RX ORDER — LABETALOL HYDROCHLORIDE 5 MG/ML
10 INJECTION, SOLUTION INTRAVENOUS ONCE
Status: COMPLETED | OUTPATIENT
Start: 2023-11-21 | End: 2023-11-21

## 2023-11-21 RX ORDER — CYCLOBENZAPRINE HCL 10 MG
10 TABLET ORAL 2 TIMES DAILY PRN
Qty: 21 TABLET | Refills: 0 | Status: SHIPPED | OUTPATIENT
Start: 2023-11-21

## 2023-11-21 RX ADMIN — LABETALOL HYDROCHLORIDE 10 MG: 5 INJECTION, SOLUTION INTRAVENOUS at 14:47

## 2023-11-21 RX ADMIN — KETOROLAC TROMETHAMINE 10 MG: 10 TABLET, FILM COATED ORAL at 16:00

## 2023-11-21 RX ADMIN — CYCLOBENZAPRINE HYDROCHLORIDE 10 MG: 10 TABLET, FILM COATED ORAL at 16:00

## 2023-11-21 RX ADMIN — KETOROLAC TROMETHAMINE 15 MG: 30 INJECTION, SOLUTION INTRAMUSCULAR at 14:47

## 2023-11-21 NOTE — DISCHARGE INSTRUCTIONS
A  personal message from Dr. Abby Gonzalez,  Thank you so much for allowing me to care for you today. I pride myself in the care and attention I give all my patients. I hope you were a witness to this tonight. If for any reason your condition does not improve or worsens, or you have a question that was not answered during your visit you can feel free to text me on my personal phone #  # [phone]. I will answer to your message and continue your care past your emergency room visit. Please understand that although you are being discharged because your condition has been deemed stable and able to be managed on an outpatient setting. However your condition may worsen as part of the natural progression of the illness/condition, if this occurs please come back to the emergency department for a repeat evaluation.

## 2023-11-21 NOTE — ED PROVIDER NOTES
History  Chief Complaint   Patient presents with    Chest Pain     Reports pain in center of chest that began approx an hour ago, radiating into left shoulder and back. Denies SOB. Pmhx: HTN    L chest pain - sharp L anterior chest. Intermittent like a 'jolt' of pain  No SOB, no sweats  Few weeks back fell and hurt L upper back and L shoulder       History provided by:  Patient   used: No    Chest Pain  Pain location:  L chest  Associated symptoms: no abdominal pain, no back pain, no cough, no fever, no palpitations, no shortness of breath and not vomiting        Prior to Admission Medications   Prescriptions Last Dose Informant Patient Reported? Taking? NIFEdipine (PROCARDIA) 10 mg capsule   No No   Sig: Take 1 capsule (10 mg total) by mouth 3 (three) times a day   atenolol (TENORMIN) 50 mg tablet   No No   Sig: Take 1 tablet (50 mg total) by mouth daily   docusate sodium (COLACE) 100 mg capsule   No No   Sig: Take 1 capsule (100 mg total) by mouth 2 (two) times a day   pantoprazole (PROTONIX) 40 mg tablet   No No   Sig: Take 1 tablet (40 mg total) by mouth daily      Facility-Administered Medications: None       Past Medical History:   Diagnosis Date    Fibroids     Hyperlipidemia     Hypertension     Migraines        Past Surgical History:   Procedure Laterality Date    ABDOMINAL SURGERY      HYSTERECTOMY         Family History   Problem Relation Age of Onset    Ovarian cancer Mother     Breast cancer Sister 64    Hypertension Sister      I have reviewed and agree with the history as documented. E-Cigarette/Vaping     E-Cigarette/Vaping Substances     Social History     Tobacco Use    Smoking status: Never    Smokeless tobacco: Never   Substance Use Topics    Alcohol use: Yes     Comment: occasionally     Drug use: Never       Review of Systems   Constitutional:  Negative for chills and fever. HENT:  Negative for ear pain and sore throat.     Eyes:  Negative for pain and visual disturbance. Respiratory:  Negative for cough, chest tightness and shortness of breath. Cardiovascular:  Positive for chest pain. Negative for palpitations. Gastrointestinal:  Negative for abdominal pain and vomiting. Genitourinary:  Negative for dysuria and hematuria. Musculoskeletal:  Negative for arthralgias and back pain. Skin:  Negative for color change and rash. Neurological:  Negative for seizures and syncope. All other systems reviewed and are negative. Physical Exam  Physical Exam  Vitals and nursing note reviewed. Constitutional:       General: She is not in acute distress. Appearance: She is well-developed. HENT:      Head: Normocephalic and atraumatic. Eyes:      Conjunctiva/sclera: Conjunctivae normal.   Cardiovascular:      Rate and Rhythm: Normal rate and regular rhythm. Heart sounds: No murmur heard. Pulmonary:      Effort: Pulmonary effort is normal. No respiratory distress. Breath sounds: Normal breath sounds. Chest:      Chest wall: Tenderness (L anterior chest wall pain) present. Abdominal:      Palpations: Abdomen is soft. Tenderness: There is no abdominal tenderness. Musculoskeletal:         General: No swelling. Cervical back: Neck supple. Right lower leg: No tenderness. No edema. Left lower leg: No tenderness. No edema. Skin:     General: Skin is warm and dry. Capillary Refill: Capillary refill takes less than 2 seconds. Neurological:      General: No focal deficit present. Mental Status: She is alert and oriented to person, place, and time.    Psychiatric:         Mood and Affect: Mood normal.         Vital Signs  ED Triage Vitals [11/21/23 1334]   Temperature Pulse Respirations Blood Pressure SpO2   98.1 °F (36.7 °C) 80 18 (!) 192/91 99 %      Temp Source Heart Rate Source Patient Position - Orthostatic VS BP Location FiO2 (%)   Temporal Monitor Sitting Right arm --      Pain Score       6           Vitals: 11/21/23 1334 11/21/23 1400 11/21/23 1430 11/21/23 1523   BP: (!) 192/91 (!) 194/91 (!) 179/79 165/77   Pulse: 80 66 64    Patient Position - Orthostatic VS: Sitting            Visual Acuity      ED Medications  Medications   sodium chloride (PF) 0.9 % injection 3 mL (has no administration in time range)   ketorolac (TORADOL) injection 15 mg (15 mg Intravenous Given 11/21/23 1447)   labetalol (NORMODYNE) injection 10 mg (10 mg Intravenous Given 11/21/23 1447)       Diagnostic Studies  Results Reviewed       Procedure Component Value Units Date/Time    Basic metabolic panel [[id number]]  (Abnormal) Collected: 11/21/23 1451    Lab Status: Final result Specimen: Blood from Arm, Left Updated: 11/21/23 1512     Sodium 140 mmol/L      Potassium 3.4 mmol/L      Chloride 106 mmol/L      CO2 27 mmol/L      ANION GAP 7 mmol/L      BUN 13 mg/dL      Creatinine 0.97 mg/dL      Glucose 86 mg/dL      Calcium 9.1 mg/dL      eGFR 60 ml/min/1.73sq m     Narrative:      [facility] guidelines for Chronic Kidney Disease (CKD):     Stage 1 with normal or high GFR (GFR > 90 mL/min/1.73 square meters)    Stage 2 Mild CKD (GFR = 60-89 mL/min/1.73 square meters)    Stage 3A Moderate CKD (GFR = 45-59 mL/min/1.73 square meters)    Stage 3B Moderate CKD (GFR = 30-44 mL/min/1.73 square meters)    Stage 4 Severe CKD (GFR = 15-29 mL/min/1.73 square meters)    Stage 5 End Stage CKD (GFR <15 mL/min/1.73 square meters)  Note: GFR calculation is accurate only with a steady state creatinine    HS Troponin 0hr (reflex protocol) [[id number]]  (Normal) Collected: 11/21/23 1351    Lab Status: Final result Specimen: Blood from Arm, Left Updated: 11/21/23 1426     hs TnI 0hr 5 ng/L     CBC and differential [[id number]] Collected: 11/21/23 1351    Lab Status: Final result Specimen: Blood from Arm, Left Updated: 11/21/23 1357     WBC 5.43 Thousand/uL      RBC 3.85 Million/uL      Hemoglobin 11.5 g/dL      Hematocrit 35.3 %      MCV 92 fL MCH 29.9 pg      MCHC 32.6 g/dL      RDW 12.5 %      MPV 10.0 fL      Platelets 063 Thousands/uL      nRBC 0 /100 WBCs      Neutrophils Relative 47 %      Immat GRANS % 0 %      Lymphocytes Relative 44 %      Monocytes Relative 8 %      Eosinophils Relative 1 %      Basophils Relative 0 %      Neutrophils Absolute 2.58 Thousands/µL      Immature Grans Absolute 0.00 Thousand/uL      Lymphocytes Absolute 2.37 Thousands/µL      Monocytes Absolute 0.42 Thousand/µL      Eosinophils Absolute 0.04 Thousand/µL      Basophils Absolute 0.02 Thousands/µL                    XR ribs left w pa chest min 3 views   ED Interpretation by Ailyn Flores MD (11/21 1443)   Nad       X-ray chest 1 view portable   ED Interpretation by Ailyn Flores MD (11/21 1407)   Radiology studies have been independently visualized by me. Preliminary interpretation: no ptx, no pleural effusion, normal heart size, no infiltrate or suspicious masses  All radiology studies will be officially read by the radiologist and any discrepancies flagged and follow through the discrepancy management process to make the patient aware of significant results. Procedures  Procedures         ED Course  ED Course as of 11/21/23 1529   Tue Nov 21, 2023   1406 WBC: 5.43   1406 Hemoglobin: 11.5   1406 HCT: 35.3   1442 hs TnI 0hr: 5             HEART Risk Score      Flowsheet Row Most Recent Value   Heart Score Risk Calculator    History 0 Filed at: 11/21/2023 1442   ECG 1 Filed at: 11/21/2023 1442   Age 2 Filed at: 11/21/2023 1442   Risk Factors 1 Filed at: 11/21/2023 1442   Troponin 0 Filed at: 11/21/2023 1442   HEART Score 4 Filed at: 11/21/2023 1442                                        Medical Decision Making  EKG was done at 1336 that shows normal sinus rhythm rate of 74 nonspecific T wave abnormalities. Inferior T wave inversions. Compared to the EKG January 11, 2022 no significant change.     Differential diagnosis includes but not limited to: Musculoskeletal chest pain, costochondritis, ACS, acute MI, pleurisy, pneumothorax, pleural effusion, pneumonia, pulmonary embolism, thoracic aortic dissection, mediastinitis, GERD/reflux gastritis/PUD. Patient's co-morbidities and risk factors were considered in the work-up of this patient. Amount and/or Complexity of Data Reviewed  Labs: ordered. Decision-making details documented in ED Course. Radiology: ordered and independent interpretation performed. Risk  Prescription drug management. Disposition  Final diagnoses:   Chest wall pain   Hypertension   Reflux esophagitis     Time reflects when diagnosis was documented in both MDM as applicable and the Disposition within this note       Time User Action Codes Description Comment    11/21/2023  3:24 PM Suraj Mckenzie [R07.89] Chest wall pain     11/21/2023  3:24 PM Suraj Mckenzie [I10] Hypertension     11/21/2023  3:28 PM Suraj Lawson [K21.00] Reflux esophagitis           ED Disposition       ED Disposition   Discharge    Condition   Stable    Date/Time   Tue Nov 21, 2023 [address] discharge to home/self care. Follow-up Information    None         Patient's Medications   Discharge Prescriptions    CYCLOBENZAPRINE (FLEXERIL) 10 MG TABLET    Take 1 tablet (10 mg total) by mouth 2 (two) times a day as needed for muscle spasms       Start Date: 11/21/2023End Date: --       Order Dose: 10 mg       Quantity: 21 tablet    Refills: 0    PANTOPRAZOLE (PROTONIX) 20 MG TABLET    Take 2 tablets (40 mg total) by mouth daily       Start Date: 11/21/2023End Date: --       Order Dose: 40 mg       Quantity: 30 tablet    Refills: 1    PREDNISONE 50 MG TABLET    Take 1 tablet (50 mg total) by mouth daily       Start Date: 11/21/2023End Date: --       Order Dose: 50 mg       Quantity: 5 tablet    Refills: 0       No discharge procedures on file.     PDMP Review       None            ED Provider  Electronically Signed by             Candida Middleton MD  11/21/23 8750

## 2024-04-04 ENCOUNTER — APPOINTMENT (EMERGENCY)
Dept: CT IMAGING | Facility: HOSPITAL | Age: 69
End: 2024-04-04
Payer: COMMERCIAL

## 2024-04-04 ENCOUNTER — APPOINTMENT (EMERGENCY)
Dept: RADIOLOGY | Facility: HOSPITAL | Age: 69
End: 2024-04-04
Payer: COMMERCIAL

## 2024-04-04 ENCOUNTER — HOSPITAL ENCOUNTER (EMERGENCY)
Facility: HOSPITAL | Age: 69
Discharge: HOME | End: 2024-04-04
Attending: EMERGENCY MEDICINE
Payer: COMMERCIAL

## 2024-04-04 VITALS
OXYGEN SATURATION: 98 % | DIASTOLIC BLOOD PRESSURE: 86 MMHG | HEART RATE: 75 BPM | TEMPERATURE: 97 F | SYSTOLIC BLOOD PRESSURE: 185 MMHG | RESPIRATION RATE: 16 BRPM

## 2024-04-04 DIAGNOSIS — R04.2 BLOOD-STREAKED SPUTUM: ICD-10-CM

## 2024-04-04 DIAGNOSIS — R03.0 ELEVATED BLOOD PRESSURE READING: ICD-10-CM

## 2024-04-04 DIAGNOSIS — R07.9 CHEST PAIN, UNSPECIFIED: Primary | ICD-10-CM

## 2024-04-04 LAB
2HR DELTA HS TROPONIN: 1 NG/L
ANION GAP SERPL CALCULATED.3IONS-SCNC: 9 MMOL/L (ref 4–13)
APTT PPP: 28 SECONDS (ref 23–37)
ATRIAL RATE: 74 BPM
BASOPHILS # BLD AUTO: 0.02 THOUSANDS/ÂΜL (ref 0–0.1)
BASOPHILS NFR BLD AUTO: 0 % (ref 0–1)
BNP SERPL-MCNC: 51 PG/ML (ref 0–100)
BUN SERPL-MCNC: 16 MG/DL (ref 5–25)
CALCIUM SERPL-MCNC: 8.8 MG/DL (ref 8.4–10.2)
CARDIAC TROPONIN I PNL SERPL HS: 5 NG/L
CARDIAC TROPONIN I PNL SERPL HS: 6 NG/L
CHLORIDE SERPL-SCNC: 105 MMOL/L (ref 96–108)
CO2 SERPL-SCNC: 25 MMOL/L (ref 21–32)
CREAT SERPL-MCNC: 1.18 MG/DL (ref 0.6–1.3)
D DIMER PPP FEU-MCNC: 0.72 UG/ML FEU
EOSINOPHIL # BLD AUTO: 0.02 THOUSAND/ÂΜL (ref 0–0.61)
EOSINOPHIL NFR BLD AUTO: 0 % (ref 0–6)
ERYTHROCYTE [DISTWIDTH] IN BLOOD BY AUTOMATED COUNT: 11.9 % (ref 11.6–15.1)
GFR SERPL CREATININE-BSD FRML MDRD: 47 ML/MIN/1.73SQ M
GLUCOSE SERPL-MCNC: 94 MG/DL (ref 65–140)
HCT VFR BLD AUTO: 35.3 % (ref 34.8–46.1)
HGB BLD-MCNC: 11.8 G/DL (ref 11.5–15.4)
IMM GRANULOCYTES # BLD AUTO: 0.01 THOUSAND/UL (ref 0–0.2)
IMM GRANULOCYTES NFR BLD AUTO: 0 % (ref 0–2)
INR PPP: 1.05 (ref 0.84–1.19)
LYMPHOCYTES # BLD AUTO: 1.92 THOUSANDS/ÂΜL (ref 0.6–4.47)
LYMPHOCYTES NFR BLD AUTO: 31 % (ref 14–44)
MCH RBC QN AUTO: 30 PG (ref 26.8–34.3)
MCHC RBC AUTO-ENTMCNC: 33.4 G/DL (ref 31.4–37.4)
MCV RBC AUTO: 90 FL (ref 82–98)
MONOCYTES # BLD AUTO: 0.49 THOUSAND/ÂΜL (ref 0.17–1.22)
MONOCYTES NFR BLD AUTO: 8 % (ref 4–12)
NEUTROPHILS # BLD AUTO: 3.65 THOUSANDS/ÂΜL (ref 1.85–7.62)
NEUTS SEG NFR BLD AUTO: 61 % (ref 43–75)
NRBC BLD AUTO-RTO: 0 /100 WBCS
P AXIS: 77 DEGREES
PLATELET # BLD AUTO: 255 THOUSANDS/UL (ref 149–390)
PMV BLD AUTO: 9.7 FL (ref 8.9–12.7)
POTASSIUM SERPL-SCNC: 4.8 MMOL/L (ref 3.5–5.3)
PR INTERVAL: 170 MS
PROTHROMBIN TIME: 14.4 SECONDS (ref 11.6–14.5)
QRS AXIS: 69 DEGREES
QRSD INTERVAL: 72 MS
QT INTERVAL: 388 MS
QTC INTERVAL: 430 MS
RBC # BLD AUTO: 3.93 MILLION/UL (ref 3.81–5.12)
SODIUM SERPL-SCNC: 139 MMOL/L (ref 135–147)
T WAVE AXIS: -16 DEGREES
VENTRICULAR RATE: 74 BPM
WBC # BLD AUTO: 6.11 THOUSAND/UL (ref 4.31–10.16)

## 2024-04-04 PROCEDURE — 85025 COMPLETE CBC W/AUTO DIFF WBC: CPT | Performed by: EMERGENCY MEDICINE

## 2024-04-04 PROCEDURE — 71275 CT ANGIOGRAPHY CHEST: CPT

## 2024-04-04 PROCEDURE — 71046 X-RAY EXAM CHEST 2 VIEWS: CPT

## 2024-04-04 PROCEDURE — 99285 EMERGENCY DEPT VISIT HI MDM: CPT | Performed by: EMERGENCY MEDICINE

## 2024-04-04 PROCEDURE — 80048 BASIC METABOLIC PNL TOTAL CA: CPT | Performed by: EMERGENCY MEDICINE

## 2024-04-04 PROCEDURE — 36415 COLL VENOUS BLD VENIPUNCTURE: CPT | Performed by: EMERGENCY MEDICINE

## 2024-04-04 PROCEDURE — 99284 EMERGENCY DEPT VISIT MOD MDM: CPT

## 2024-04-04 PROCEDURE — 85610 PROTHROMBIN TIME: CPT | Performed by: EMERGENCY MEDICINE

## 2024-04-04 PROCEDURE — 93010 ELECTROCARDIOGRAM REPORT: CPT | Performed by: INTERNAL MEDICINE

## 2024-04-04 PROCEDURE — 85730 THROMBOPLASTIN TIME PARTIAL: CPT | Performed by: EMERGENCY MEDICINE

## 2024-04-04 PROCEDURE — 93005 ELECTROCARDIOGRAM TRACING: CPT

## 2024-04-04 PROCEDURE — 85379 FIBRIN DEGRADATION QUANT: CPT | Performed by: EMERGENCY MEDICINE

## 2024-04-04 PROCEDURE — 83880 ASSAY OF NATRIURETIC PEPTIDE: CPT | Performed by: EMERGENCY MEDICINE

## 2024-04-04 PROCEDURE — 84484 ASSAY OF TROPONIN QUANT: CPT | Performed by: EMERGENCY MEDICINE

## 2024-04-04 PROCEDURE — 96365 THER/PROPH/DIAG IV INF INIT: CPT

## 2024-04-04 RX ORDER — DICYCLOMINE HCL 20 MG
20 TABLET ORAL ONCE
Status: DISCONTINUED | OUTPATIENT
Start: 2024-04-04 | End: 2024-04-04

## 2024-04-04 RX ADMIN — SODIUM CHLORIDE, SODIUM LACTATE, POTASSIUM CHLORIDE, AND CALCIUM CHLORIDE 1000 ML: .6; .31; .03; .02 INJECTION, SOLUTION INTRAVENOUS at 16:08

## 2024-04-04 RX ADMIN — IOHEXOL 85 ML: 350 INJECTION, SOLUTION INTRAVENOUS at 16:01

## 2024-04-04 NOTE — DISCHARGE INSTRUCTIONS
Take your medications as prescribed, and monitor your blood pressure at home.  Follow-up with your primary care doctor to make sure you are doing better, and for further evaluation of your symptoms.  Return if you start coughing or vomiting up large amounts of blood, feel like you are going to pass out, that your heart is racing, or for any other concerns.

## 2024-04-04 NOTE — ED PROVIDER NOTES
History  Chief Complaint   Patient presents with    Hemoptysis     Began last night. Denies SOB but reports CP that has been intermittent.        Hemoptysis      Prior to Admission Medications   Prescriptions Last Dose Informant Patient Reported? Taking?   NIFEdipine (PROCARDIA) 10 mg capsule   No No   Sig: Take 1 capsule (10 mg total) by mouth 3 (three) times a day   atenolol (TENORMIN) 50 mg tablet   No No   Sig: Take 1 tablet (50 mg total) by mouth daily   cyclobenzaprine (FLEXERIL) 10 mg tablet   No No   Sig: Take 1 tablet (10 mg total) by mouth 2 (two) times a day as needed for muscle spasms   docusate sodium (COLACE) 100 mg capsule   No No   Sig: Take 1 capsule (100 mg total) by mouth 2 (two) times a day   pantoprazole (PROTONIX) 20 mg tablet   No No   Sig: Take 2 tablets (40 mg total) by mouth daily   predniSONE 50 mg tablet   No No   Sig: Take 1 tablet (50 mg total) by mouth daily      Facility-Administered Medications: None       Past Medical History:   Diagnosis Date    Fibroids     Hyperlipidemia     Hypertension     Migraines        Past Surgical History:   Procedure Laterality Date    ABDOMINAL SURGERY      HYSTERECTOMY         Family History   Problem Relation Age of Onset    Ovarian cancer Mother     Breast cancer Sister 56    Hypertension Sister      I have reviewed and agree with the history as documented.    E-Cigarette/Vaping     E-Cigarette/Vaping Substances     Social History     Tobacco Use    Smoking status: Never    Smokeless tobacco: Never   Substance Use Topics    Alcohol use: Yes     Comment: occasionally     Drug use: Never       Review of Systems    Physical Exam  Physical Exam  Vitals and nursing note reviewed.   Constitutional:       General: She is not in acute distress.     Appearance: She is well-developed.   HENT:      Head: Normocephalic and atraumatic.      Nose: No congestion.      Right Nostril: No epistaxis.      Left Nostril: No epistaxis.      Mouth/Throat:      Mouth:  Mucous membranes are moist. No oral lesions.      Pharynx: Oropharynx is clear.      Comments: No lesions, bleeding, dried blood noted in oropharynx  Eyes:      Conjunctiva/sclera: Conjunctivae normal.      Pupils: Pupils are equal, round, and reactive to light.   Neck:      Trachea: No tracheal deviation.   Cardiovascular:      Rate and Rhythm: Normal rate and regular rhythm.      Heart sounds: Normal heart sounds.   Pulmonary:      Effort: Pulmonary effort is normal. No respiratory distress.      Breath sounds: Normal breath sounds.   Abdominal:      General: There is no distension.      Palpations: Abdomen is soft.      Tenderness: There is no abdominal tenderness.   Musculoskeletal:      Cervical back: Normal range of motion.      Right lower leg: No edema.      Left lower leg: No edema.   Skin:     General: Skin is warm and dry.   Neurological:      Mental Status: She is alert and oriented to person, place, and time.      GCS: GCS eye subscore is 4. GCS verbal subscore is 5. GCS motor subscore is 6.   Psychiatric:         Mood and Affect: Mood and affect normal.         Behavior: Behavior normal.         Vital Signs  ED Triage Vitals [04/04/24 1314]   Temperature Pulse Respirations Blood Pressure SpO2   (!) 97 °F (36.1 °C) 77 19 156/85 99 %      Temp Source Heart Rate Source Patient Position - Orthostatic VS BP Location FiO2 (%)   Temporal Monitor Sitting Left arm --      Pain Score       --           Vitals:    04/04/24 1500 04/04/24 1625 04/04/24 1630 04/04/24 1700   BP: 148/68 (!) 183/83 (!) 178/84 (!) 185/86   Pulse: 65 76 74 75   Patient Position - Orthostatic VS: Lying Lying Lying Lying         Visual Acuity      ED Medications  Medications   lactated ringers bolus 1,000 mL (0 mL Intravenous Stopped 4/4/24 1723)   iohexol (OMNIPAQUE) 350 MG/ML injection (MULTI-DOSE) 85 mL (85 mL Intravenous Given 4/4/24 1601)       Diagnostic Studies  Results Reviewed       Procedure Component Value Units Date/Time    HS  Troponin I 2hr [[id number]]  (Normal) Collected: 04/04/24 1615    Lab Status: Final result Specimen: Blood from Arm, Right Updated: 04/04/24 1647     hs TnI 2hr 6 ng/L      Delta 2hr hsTnI 1 ng/L     Basic metabolic panel [[id number]] Collected: 04/04/24 1448    Lab Status: Final result Specimen: Blood from Arm, Right Updated: 04/04/24 1509     Sodium 139 mmol/L      Potassium 4.8 mmol/L      Chloride 105 mmol/L      CO2 25 mmol/L      ANION GAP 9 mmol/L      BUN 16 mg/dL      Creatinine 1.18 mg/dL      Glucose 94 mg/dL      Calcium 8.8 mg/dL      eGFR 47 ml/min/1.73sq m     Narrative:      [employer] guidelines for Chronic Kidney Disease (CKD):     Stage 1 with normal or high GFR (GFR > 90 mL/min/1.73 square meters)    Stage 2 Mild CKD (GFR = 60-89 mL/min/1.73 square meters)    Stage 3A Moderate CKD (GFR = 45-59 mL/min/1.73 square meters)    Stage 3B Moderate CKD (GFR = 30-44 mL/min/1.73 square meters)    Stage 4 Severe CKD (GFR = 15-29 mL/min/1.73 square meters)    Stage 5 End Stage CKD (GFR <15 mL/min/1.73 square meters)  Note: GFR calculation is accurate only with a steady state creatinine    D-Dimer [[id number]]  (Abnormal) Collected: 04/04/24 1448    Lab Status: Final result Specimen: Blood from Arm, Right Updated: 04/04/24 1506     D-Dimer, Quant 0.72 ug/ml FEU     Narrative:      In the evaluation for possible pulmonary embolism, in the appropriate (Well's Score of 4 or less) patient, the age adjusted d-dimer cutoff for this patient can be calculated as:    Age x 0.01 (in ug/mL) for Age-adjusted D-dimer exclusion threshold for a patient over 50 years.    Protime-INR [[id number]]  (Normal) Collected: 04/04/24 1448    Lab Status: Final result Specimen: Blood from Arm, Right Updated: 04/04/24 1504     Protime 14.4 seconds      INR 1.05    APTT [[id number]]  (Normal) Collected: 04/04/24 1448    Lab Status: Final result Specimen: Blood from Arm, Right Updated: 04/04/24 1504     PTT 28 seconds      HS Troponin 0hr (reflex protocol) [[id number]]  (Normal) Collected: 04/04/24 1359    Lab Status: Final result Specimen: Blood from Arm, Right Updated: 04/04/24 1431     hs TnI 0hr 5 ng/L     B-Type Natriuretic Peptide(BNP) [[id number]]  (Normal) Collected: 04/04/24 1359    Lab Status: Final result Specimen: Blood from Arm, Right Updated: 04/04/24 1431     BNP 51 pg/mL     CBC and differential [[id number]] Collected: 04/04/24 1359    Lab Status: Final result Specimen: Blood from Arm, Right Updated: 04/04/24 1406     WBC 6.11 Thousand/uL      RBC 3.93 Million/uL      Hemoglobin 11.8 g/dL      Hematocrit 35.3 %      MCV 90 fL      MCH 30.0 pg      MCHC 33.4 g/dL      RDW 11.9 %      MPV 9.7 fL      Platelets 255 Thousands/uL      nRBC 0 /100 WBCs      Neutrophils Relative 61 %      Immature Grans % 0 %      Lymphocytes Relative 31 %      Monocytes Relative 8 %      Eosinophils Relative 0 %      Basophils Relative 0 %      Neutrophils Absolute 3.65 Thousands/µL      Absolute Immature Grans 0.01 Thousand/uL      Absolute Lymphocytes 1.92 Thousands/µL      Absolute Monocytes 0.49 Thousand/µL      Eosinophils Absolute 0.02 Thousand/µL      Basophils Absolute 0.02 Thousands/µL                    CTA ED chest PE study   Final Result by Padmaja Ludwig MD (04/04 1637)      No acute pulmonary embolism or focal consolidation.                  Workstation performed: [id number]         XR chest 2 views   Final Result by Padmaja Ludwig MD (04/04 1632)      No acute cardiopulmonary disease.            Workstation performed: [id number]                    Procedures  ECG 12 Lead Documentation Only    Date/Time: 4/4/2024 1:51 PM    Performed by: Codi Carrillo MD  Authorized by: Codi Carrillo MD    Indications / Diagnosis:  Chest pain  ECG reviewed by me, the ED Provider: yes    Patient location:  ED  Previous ECG:     Previous ECG:  Compared to current    Comparison ECG info:  11/21/23     Similarity:  No change  Interpretation:     Interpretation: abnormal    Rate:     ECG rate:  74    ECG rate assessment: normal    Rhythm:     Rhythm: sinus rhythm    Ectopy:     Ectopy: none    QRS:     QRS axis:  Normal    QRS intervals:  Normal  Conduction:     Conduction: normal    ST segments:     ST segments:  Normal  T waves:     T waves: inverted      Inverted:  II, III, aVF, V3, V4, V5 and V6           ED Course                               SBIRT 20yo+      Flowsheet Row Most Recent Value   Initial Alcohol Screen: US AUDIT-C     1. How often do you have a drink containing alcohol? 0 Filed at: 04/04/2024 9883   2. How many drinks containing alcohol do you have on a typical day you are drinking?  0 Filed at: 04/04/2024 1359   3b. FEMALE Any Age, or MALE 65+: How often do you have 4 or more drinks on one occassion? 0 Filed at: 04/04/2024 1359   Audit-C Score 0 Filed at: 04/04/2024 1359   ANN MARIE: How many times in the past year have you...    Used an illegal drug or used a prescription medication for non-medical reasons? Never Filed at: 04/04/2024 1359            Wells' Criteria for PE      Flowsheet Row Most Recent Value   Wells' Criteria for PE    Clinical signs and symptoms of DVT 0 Filed at: 04/04/2024 1516   PE is primary diagnosis or equally likely 3 Filed at: 04/04/2024 1516   HR >100 0 Filed at: 04/04/2024 1516   Immobilization at least 3 days or Surgery in the previous 4 weeks 0 Filed at: 04/04/2024 1516   Previous, objectively diagnosed PE or DVT 0 Filed at: 04/04/2024 1516   Hemoptysis 1 Filed at: 04/04/2024 1516   Malignancy with treatment within 6 months or palliative 0 Filed at: 04/04/2024 1516   Wells' Criteria Total 4 Filed at: 04/04/2024 1516                  Medical Decision Making  This is a 69-year-old female who presents here today after spitting up blood on several occasions.  She has had intermittent chest pain since yesterday, waxing and waning, occasionally going away completely but  usually just subsiding.  She says it is most in the center of her chest but is migratory throughout the entire chest.  She has no pain into her back, abdomen, arms.  She says sometimes taking a deep breath will make the pain worse but not always.  She denies any other triggers for her symptoms.  She says once today and a couple of times a day she has had bloody sputum.  She says she will spit out blood-tinged sputum.  She denies association to coughing, vomiting, dry heaving.  She has underlying reflux and last had an endoscopy about 7 or 8 years ago.  She denies any recent worsening reflux symptoms, any hematochezia, melena, nosebleeds,, bleeding gums, other areas of bleeding.  She denies any blood thinning medications.  She denies fevers or other URI symptoms, nausea, vomiting, diarrhea.  She said given several episodes of blood-tinged sputum today, came in for evaluation.    ROS: Otherwise negative, unless stated as above.    She is well-appearing, no acute distress.  Exam is unremarkable.  The patient is denying coughing or vomiting blood, so it is uncertain of the true source of this, whether it is hemoptysis, hematemesis, from nosebleed, or other oropharyngeal etiology.  She is not having any other symptoms truly suggestive upper GI bleed, there is the possibility of PE with occasional pleuritic component, the chest pain is atypical.  With history of hypertension she is at risk for ACS the story is atypical.  She is not having any other infectious symptoms so this is a possibility.  We we will check chest x-ray and lab work to evaluate.  Next    Hemoglobin is stable at 11.8.  D-dimer is 0.72, and based on moderate risk criteria, we will get a PE study.  Lab work is otherwise unremarkable.  Chest x-ray was read by myself, and shows no acute abnormalities.    CT scan was unremarkable.  I discussed with patient and daughter via phone findings, continued management at home, following up with her PCP regarding her  symptoms, and indications for return to the emergency department, and she expresses understanding with this plan.  She has had no further episodes of bloody sputum while in the emergency department.  She has had mildly increase of blood pressures while in the emergency department denies any symptoms related to this and says they have been well-controlled at home.    Problems Addressed:  Blood-streaked sputum: acute illness or injury  Chest pain, unspecified: acute illness or injury  Elevated blood pressure reading: acute illness or injury    Amount and/or Complexity of Data Reviewed  Labs: ordered. Decision-making details documented in ED Course.  Radiology: ordered and independent interpretation performed. Decision-making details documented in ED Course.  ECG/medicine tests: ordered and independent interpretation performed. Decision-making details documented in ED Course.    Risk  Prescription drug management.             Disposition  Final diagnoses:   Chest pain, unspecified   Blood-streaked sputum   Elevated blood pressure reading     Time reflects when diagnosis was documented in both MDM as applicable and the Disposition within this note       Time User Action Codes Description Comment    4/4/2024  5:24 PM Codi Carrillo [R07.9] Chest pain, unspecified     4/4/2024  5:24 PM Codi Carrillo Add [R04.2] Blood-streaked sputum     4/4/2024  5:28 PM Codi Carrillo Add [R03.0] Elevated blood pressure reading           ED Disposition       ED Disposition   Discharge    Condition   Fair    Date/Time   Th Apr 4, 2024 1723    Comment   Jessica Sampson discharge to home/self care.             Follow-up Information       Follow up With Specialties Details Why Contact Info    your primary care doctor  Schedule an appointment as soon as possible for a visit in 3 days to follow up on your symptoms             Discharge Medication List as of 4/4/2024  5:30 PM        CONTINUE these  medications which have NOT CHANGED    Details   atenolol (TENORMIN) 50 mg tablet Take 1 tablet (50 mg total) by mouth daily, Starting Tue 1/11/2022, Until Thu 2/10/2022, Normal      cyclobenzaprine (FLEXERIL) 10 mg tablet Take 1 tablet (10 mg total) by mouth 2 (two) times a day as needed for muscle spasms, Starting Tue 11/21/2023, Print      docusate sodium (COLACE) 100 mg capsule Take 1 capsule (100 mg total) by mouth 2 (two) times a day, Starting Tue 1/11/2022, Until Thu 2/10/2022, Normal      NIFEdipine (PROCARDIA) 10 mg capsule Take 1 capsule (10 mg total) by mouth 3 (three) times a day, Starting Tue 1/11/2022, Until Thu 2/10/2022, Normal      pantoprazole (PROTONIX) 20 mg tablet Take 2 tablets (40 mg total) by mouth daily, Starting Tue 11/21/2023, Print      predniSONE 50 mg tablet Take 1 tablet (50 mg total) by mouth daily, Starting Tue 11/21/2023, Print             No discharge procedures on file.    PDMP Review       None            ED Provider  Electronically Signed by             Codi Carrillo MD  04/04/24 7813

## 2024-09-21 ENCOUNTER — HOSPITAL ENCOUNTER (EMERGENCY)
Facility: HOSPITAL | Age: 69
Discharge: HOME | End: 2024-09-21
Attending: EMERGENCY MEDICINE
Payer: COMMERCIAL

## 2024-09-21 ENCOUNTER — APPOINTMENT (EMERGENCY)
Dept: RADIOLOGY | Facility: HOSPITAL | Age: 69
End: 2024-09-21
Payer: COMMERCIAL

## 2024-09-21 VITALS
TEMPERATURE: 97.3 F | RESPIRATION RATE: 15 BRPM | SYSTOLIC BLOOD PRESSURE: 152 MMHG | DIASTOLIC BLOOD PRESSURE: 74 MMHG | HEART RATE: 57 BPM | OXYGEN SATURATION: 97 %

## 2024-09-21 DIAGNOSIS — R07.9 CHEST PAIN, UNSPECIFIED: Primary | ICD-10-CM

## 2024-09-21 LAB
2HR DELTA HS TROPONIN: 0 NG/L
ALBUMIN SERPL BCG-MCNC: 4.1 G/DL (ref 3.5–5)
ALP SERPL-CCNC: 70 U/L (ref 34–104)
ALT SERPL W P-5'-P-CCNC: 11 U/L (ref 7–52)
ANION GAP SERPL CALCULATED.3IONS-SCNC: 6 MMOL/L (ref 4–13)
AST SERPL W P-5'-P-CCNC: 18 U/L (ref 13–39)
ATRIAL RATE: 60 BPM
ATRIAL RATE: 68 BPM
BASOPHILS # BLD AUTO: 0.02 THOUSANDS/ΜL (ref 0–0.1)
BASOPHILS NFR BLD AUTO: 0 % (ref 0–1)
BILIRUB SERPL-MCNC: 0.79 MG/DL (ref 0.2–1)
BNP SERPL-MCNC: 41 PG/ML (ref 0–100)
BUN SERPL-MCNC: 12 MG/DL (ref 5–25)
CALCIUM SERPL-MCNC: 8.9 MG/DL (ref 8.4–10.2)
CARDIAC TROPONIN I PNL SERPL HS: 4 NG/L
CARDIAC TROPONIN I PNL SERPL HS: 4 NG/L
CHLORIDE SERPL-SCNC: 103 MMOL/L (ref 96–108)
CO2 SERPL-SCNC: 29 MMOL/L (ref 21–32)
CREAT SERPL-MCNC: 1.08 MG/DL (ref 0.6–1.3)
D DIMER PPP FEU-MCNC: 0.51 UG/ML FEU
EOSINOPHIL # BLD AUTO: 0.01 THOUSAND/ΜL (ref 0–0.61)
EOSINOPHIL NFR BLD AUTO: 0 % (ref 0–6)
ERYTHROCYTE [DISTWIDTH] IN BLOOD BY AUTOMATED COUNT: 11.9 % (ref 11.6–15.1)
GFR SERPL CREATININE-BSD FRML MDRD: 52 ML/MIN/1.73SQ M
GLUCOSE SERPL-MCNC: 109 MG/DL (ref 65–140)
HCT VFR BLD AUTO: 36.3 % (ref 34.8–46.1)
HGB BLD-MCNC: 11.9 G/DL (ref 11.5–15.4)
IMM GRANULOCYTES # BLD AUTO: 0.01 THOUSAND/UL (ref 0–0.2)
IMM GRANULOCYTES NFR BLD AUTO: 0 % (ref 0–2)
LYMPHOCYTES # BLD AUTO: 2.12 THOUSANDS/ΜL (ref 0.6–4.47)
LYMPHOCYTES NFR BLD AUTO: 35 % (ref 14–44)
MCH RBC QN AUTO: 29.5 PG (ref 26.8–34.3)
MCHC RBC AUTO-ENTMCNC: 32.8 G/DL (ref 31.4–37.4)
MCV RBC AUTO: 90 FL (ref 82–98)
MONOCYTES # BLD AUTO: 0.46 THOUSAND/ΜL (ref 0.17–1.22)
MONOCYTES NFR BLD AUTO: 8 % (ref 4–12)
NEUTROPHILS # BLD AUTO: 3.39 THOUSANDS/ΜL (ref 1.85–7.62)
NEUTS SEG NFR BLD AUTO: 57 % (ref 43–75)
NRBC BLD AUTO-RTO: 0 /100 WBCS
P AXIS: 13 DEGREES
P AXIS: 75 DEGREES
PLATELET # BLD AUTO: 280 THOUSANDS/UL (ref 149–390)
PMV BLD AUTO: 9.5 FL (ref 8.9–12.7)
POTASSIUM SERPL-SCNC: 4.5 MMOL/L (ref 3.5–5.3)
PR INTERVAL: 180 MS
PR INTERVAL: 184 MS
PROT SERPL-MCNC: 8 G/DL (ref 6.4–8.4)
QRS AXIS: 58 DEGREES
QRS AXIS: 63 DEGREES
QRSD INTERVAL: 72 MS
QRSD INTERVAL: 78 MS
QT INTERVAL: 384 MS
QT INTERVAL: 426 MS
QTC INTERVAL: 408 MS
QTC INTERVAL: 426 MS
RBC # BLD AUTO: 4.03 MILLION/UL (ref 3.81–5.12)
SODIUM SERPL-SCNC: 138 MMOL/L (ref 135–147)
T WAVE AXIS: -10 DEGREES
T WAVE AXIS: -32 DEGREES
VENTRICULAR RATE: 60 BPM
VENTRICULAR RATE: 68 BPM
WBC # BLD AUTO: 6.01 THOUSAND/UL (ref 4.31–10.16)

## 2024-09-21 PROCEDURE — 84484 ASSAY OF TROPONIN QUANT: CPT

## 2024-09-21 PROCEDURE — 99285 EMERGENCY DEPT VISIT HI MDM: CPT

## 2024-09-21 PROCEDURE — 96374 THER/PROPH/DIAG INJ IV PUSH: CPT

## 2024-09-21 PROCEDURE — 80053 COMPREHEN METABOLIC PANEL: CPT

## 2024-09-21 PROCEDURE — 71046 X-RAY EXAM CHEST 2 VIEWS: CPT

## 2024-09-21 PROCEDURE — 85379 FIBRIN DEGRADATION QUANT: CPT

## 2024-09-21 PROCEDURE — 85025 COMPLETE CBC W/AUTO DIFF WBC: CPT

## 2024-09-21 PROCEDURE — 93010 ELECTROCARDIOGRAM REPORT: CPT | Performed by: STUDENT IN AN ORGANIZED HEALTH CARE EDUCATION/TRAINING PROGRAM

## 2024-09-21 PROCEDURE — 93005 ELECTROCARDIOGRAM TRACING: CPT

## 2024-09-21 PROCEDURE — 83880 ASSAY OF NATRIURETIC PEPTIDE: CPT

## 2024-09-21 PROCEDURE — 99285 EMERGENCY DEPT VISIT HI MDM: CPT | Performed by: EMERGENCY MEDICINE

## 2024-09-21 PROCEDURE — 36415 COLL VENOUS BLD VENIPUNCTURE: CPT

## 2024-09-21 RX ORDER — NAPROXEN 500 MG/1
500 TABLET ORAL 2 TIMES DAILY WITH MEALS
Qty: 30 TABLET | Refills: 0 | Status: SHIPPED | OUTPATIENT
Start: 2024-09-21

## 2024-09-21 RX ORDER — KETOROLAC TROMETHAMINE 30 MG/ML
15 INJECTION, SOLUTION INTRAMUSCULAR; INTRAVENOUS ONCE
Status: COMPLETED | OUTPATIENT
Start: 2024-09-21 | End: 2024-09-21

## 2024-09-21 RX ADMIN — KETOROLAC TROMETHAMINE 15 MG: 30 INJECTION, SOLUTION INTRAMUSCULAR at 10:18

## 2024-09-21 NOTE — DISCHARGE INSTRUCTIONS
Tylenol/Naproxen for pain  May use pepcid or prilosec for heartburn  Avoid caffeine, spicy foods, alcohol, smoking  Follow up with primary care provider  Follow up with cardiology  Return to ER if symptoms worsen

## 2024-09-21 NOTE — ED PROVIDER NOTES
1. Chest pain, unspecified      ED Disposition       ED Disposition   Discharge    Condition   Stable    Date/Time   Sat Sep 21, 2024 12:46 PM    Comment   Jessica Sampson discharge to home/self care.                   Assessment & Plan       Medical Decision Making  Exam without evidence of volume overload so doubt heart failure.  EKG without signs of active ischemia.  Troponin 4, delta troponin 4, so doubt NSTEMI.  Presentation not consistent with acute PE, pneumothorax, pericarditis, tamponade, pneumonia.  Heart score: 6.   CBC negative for leukocytosis, anemia.  CMP negative for metabolic derangements.  Spoke with patient at length about admission versus discharge.  Patient states that her symptoms improved with IV Toradol.  Patient would like to go home and follow-up with her cardiology team.  Chest xray resulted: no acute abnormalities. Follow-up with cardiology/primary care provider.  Return to ER symptoms worsens or questions or concerns arise at home.          Amount and/or Complexity of Data Reviewed  Labs: ordered. Decision-making details documented in ED Course.  Radiology: ordered.    Risk  Prescription drug management.        HEART Risk Score      Flowsheet Row Most Recent Value   Heart Score Risk Calculator    History 1 Filed at: 09/21/2024 1118   ECG 1 Filed at: 09/21/2024 1118   Age 2 Filed at: 09/21/2024 1118   Risk Factors 2 Filed at: 09/21/2024 1118   Troponin 0 Filed at: 09/21/2024 1118   HEART Score 6 Filed at: 09/21/2024 1118               ED Course as of 09/21/24 1330   Sat Sep 21, 2024   1115 D-dimer, quantitative(!)  Age adjusted 0.69. not indicated to get CTA PE study.   1115 HS Troponin 0hr (reflex protocol)  Will collect delta troponin, awaiting results for disposition       Medications   ketorolac (TORADOL) injection 15 mg (15 mg Intravenous Given 9/21/24 1018)       History of Present Illness       68 y/o female patient presents to the ER for evaluation of chest pain. PMH: Hypertension,  Hyperlipidemia. Patient stated that she started to have mid sternal chest pain for the last two days. Patient stated that the pain is constant and does not get worse with movement. She reports that the pain radiates into her left arm. She reports that she has not had trauma/injury. Complains of nausea without vomiting or diarrhea. No recent long travel, leg swelling, hx malignancy, hx cancer. No hormone therapy or smoking.       Chest Pain  Chest pain location: mid sternal.  Pain quality: aching and dull    Pain radiates to:  L arm  Pain radiates to the back: no    Pain severity:  Moderate  Onset quality:  Gradual  Duration:  2 days  Timing:  Constant  Progression:  Unchanged  Chronicity:  New  Context: at rest    Context: no movement    Relieved by:  Nothing  Worsened by:  Nothing tried  Ineffective treatments:  None tried  Associated symptoms: nausea    Associated symptoms: no abdominal pain, no altered mental status, no back pain, no cough, no dizziness, no fever, no numbness, no palpitations, no shortness of breath and not vomiting    Risk factors: high cholesterol, hypertension and obesity    Risk factors: no birth control, no diabetes mellitus, no immobilization, no prior DVT/PE, no smoking and no surgery        Review of Systems   Constitutional:  Negative for chills and fever.   HENT:  Negative for ear pain and sore throat.    Eyes:  Negative for pain and visual disturbance.   Respiratory:  Negative for cough and shortness of breath.    Cardiovascular:  Positive for chest pain. Negative for palpitations.   Gastrointestinal:  Positive for nausea. Negative for abdominal pain and vomiting.   Genitourinary:  Negative for dysuria and hematuria.   Musculoskeletal:  Negative for arthralgias and back pain.   Skin:  Negative for color change and rash.   Neurological:  Negative for dizziness, seizures, syncope and numbness.   All other systems reviewed and are negative.          Objective     ED Triage Vitals    Temperature Pulse Blood Pressure Respirations SpO2 Patient Position - Orthostatic VS   09/21/24 1006 09/21/24 1006 09/21/24 1006 09/21/24 1006 09/21/24 1006 09/21/24 1100   (!) 97.3 °F (36.3 °C) 75 (!) 202/85 18 96 % Lying      Temp src Heart Rate Source BP Location FiO2 (%) Pain Score    -- 09/21/24 1100 09/21/24 1130 -- 09/21/24 1018     Monitor Left arm  10 - Worst Possible Pain        Physical Exam  Vitals and nursing note reviewed.   Constitutional:       General: She is not in acute distress.     Appearance: She is well-developed.   HENT:      Head: Normocephalic and atraumatic.   Eyes:      Conjunctiva/sclera: Conjunctivae normal.   Cardiovascular:      Rate and Rhythm: Normal rate and regular rhythm.      Pulses:           Radial pulses are 2+ on the right side and 2+ on the left side.      Heart sounds: Normal heart sounds. No murmur heard.  Pulmonary:      Effort: Pulmonary effort is normal. No respiratory distress.      Breath sounds: Normal breath sounds.   Abdominal:      Palpations: Abdomen is soft.      Tenderness: There is no abdominal tenderness.   Musculoskeletal:         General: No swelling.      Cervical back: Neck supple.   Skin:     General: Skin is warm and dry.      Capillary Refill: Capillary refill takes less than 2 seconds.   Neurological:      Mental Status: She is alert and oriented to person, place, and time.   Psychiatric:         Mood and Affect: Mood normal.         Behavior: Behavior normal.         Labs Reviewed   D-DIMER, QUANTITATIVE - Abnormal       Result Value    D-Dimer, Quant 0.51 (*)     Narrative:     In the evaluation for possible pulmonary embolism, in the appropriate (Well's Score of 4 or less) patient, the age adjusted d-dimer cutoff for this patient can be calculated as:    Age x 0.01 (in ug/mL) for Age-adjusted D-dimer exclusion threshold for a patient over 50 years.   HS TROPONIN I 0HR - Normal    hs TnI 0hr 4     B-TYPE NATRIURETIC PEPTIDE (BNP) - Normal    BNP  41     HS TROPONIN I 2HR - Normal    hs TnI 2hr 4      Delta 2hr hsTnI 0     CBC AND DIFFERENTIAL    WBC 6.01      RBC 4.03      Hemoglobin 11.9      Hematocrit 36.3      MCV 90      MCH 29.5      MCHC 32.8      RDW 11.9      MPV 9.5      Platelets 280      nRBC 0      Segmented % 57      Immature Grans % 0      Lymphocytes % 35      Monocytes % 8      Eosinophils Relative 0      Basophils Relative 0      Absolute Neutrophils 3.39      Absolute Immature Grans 0.01      Absolute Lymphocytes 2.12      Absolute Monocytes 0.46      Eosinophils Absolute 0.01      Basophils Absolute 0.02     COMPREHENSIVE METABOLIC PANEL    Sodium 138      Potassium 4.5      Chloride 103      CO2 29      ANION GAP 6      BUN 12      Creatinine 1.08      Glucose 109      Calcium 8.9      AST 18      ALT 11      Alkaline Phosphatase 70      Total Protein 8.0      Albumin 4.1      Total Bilirubin 0.79      eGFR 52      Narrative:     [employer] guidelines for Chronic Kidney Disease (CKD):     Stage 1 with normal or high GFR (GFR > 90 mL/min/1.73 square meters)    Stage 2 Mild CKD (GFR = 60-89 mL/min/1.73 square meters)    Stage 3A Moderate CKD (GFR = 45-59 mL/min/1.73 square meters)    Stage 3B Moderate CKD (GFR = 30-44 mL/min/1.73 square meters)    Stage 4 Severe CKD (GFR = 15-29 mL/min/1.73 square meters)    Stage 5 End Stage CKD (GFR <15 mL/min/1.73 square meters)  Note: GFR calculation is accurate only with a steady state creatinine   HS TROPONIN I 4HR     X-ray chest 2 views   Final Interpretation by Suleman Faustin MD (09/21 1304)      No acute cardiopulmonary disease.            Resident: COLLEEN CRAIN I, the attending radiologist, have reviewed the images and agree with the final report above.      Workstation performed: [id number]             ECG 12 Lead Documentation Only    Date/Time: 9/21/2024 10:13 AM    Performed by: CLEMENTE Clifton  Authorized by: CLEMENTE Clifton    Indications /  Diagnosis:  Chest pain  ECG reviewed by me, the ED Provider: yes    Patient location:  ED  Previous ECG:     Previous ECG:  Compared to current    Comparison ECG info:  4/4/24    Similarity:  No change    Comparison to cardiac monitor: Yes    Interpretation:     Interpretation: non-specific    Rate:     ECG rate:  68    ECG rate assessment: normal    Rhythm:     Rhythm: sinus rhythm    Ectopy:     Ectopy: none    QRS:     QRS axis:  Normal    QRS intervals:  Normal  Conduction:     Conduction: normal    ST segments:     ST segments:  Non-specific  T waves:     T waves: normal        ED Medication and Procedure Management   Prior to Admission Medications   Prescriptions Last Dose Informant Patient Reported? Taking?   NIFEdipine (PROCARDIA) 10 mg capsule   No No   Sig: Take 1 capsule (10 mg total) by mouth 3 (three) times a day   atenolol (TENORMIN) 50 mg tablet   No No   Sig: Take 1 tablet (50 mg total) by mouth daily   cyclobenzaprine (FLEXERIL) 10 mg tablet   No No   Sig: Take 1 tablet (10 mg total) by mouth 2 (two) times a day as needed for muscle spasms   docusate sodium (COLACE) 100 mg capsule   No No   Sig: Take 1 capsule (100 mg total) by mouth 2 (two) times a day   pantoprazole (PROTONIX) 20 mg tablet   No No   Sig: Take 2 tablets (40 mg total) by mouth daily   predniSONE 50 mg tablet   No No   Sig: Take 1 tablet (50 mg total) by mouth daily      Facility-Administered Medications: None     Discharge Medication List as of 9/21/2024 12:59 PM        START taking these medications    Details   naproxen (Naprosyn) 500 mg tablet Take 1 tablet (500 mg total) by mouth 2 (two) times a day with meals, Starting Sat 9/21/2024, Normal           CONTINUE these medications which have NOT CHANGED    Details   atenolol (TENORMIN) 50 mg tablet Take 1 tablet (50 mg total) by mouth daily, Starting Tue 1/11/2022, Until Thu 2/10/2022, Normal      cyclobenzaprine (FLEXERIL) 10 mg tablet Take 1 tablet (10 mg total) by mouth 2 (two)  times a day as needed for muscle spasms, Starting Tue 11/21/2023, Print      docusate sodium (COLACE) 100 mg capsule Take 1 capsule (100 mg total) by mouth 2 (two) times a day, Starting Tue 1/11/2022, Until Thu 2/10/2022, Normal      NIFEdipine (PROCARDIA) 10 mg capsule Take 1 capsule (10 mg total) by mouth 3 (three) times a day, Starting Tue 1/11/2022, Until Thu 2/10/2022, Normal      pantoprazole (PROTONIX) 20 mg tablet Take 2 tablets (40 mg total) by mouth daily, Starting Tue 11/21/2023, Print      predniSONE 50 mg tablet Take 1 tablet (50 mg total) by mouth daily, Starting Tue 11/21/2023, Print           No discharge procedures on file.     CLEMENTE Clifton  09/21/24 [id number]

## 2025-01-12 NOTE — DISCHARGE INSTRUCTIONS
Follow up with your primary care doctor for lesion seen on liver, may need MRI in the future  You can get a copy of your CT scan report by calling [facility]  and asking for medical records  Determining the exact cause for all patients with chest pain is extremely difficult in the emergency department  Our primary focus is to rule-out immediate life-threatening diseases  If this cannot be done, the definitive diagnosis frequently needs to be determined over time  Sometimes, specialist are required cardiologists, pulmonologists, gastroenterologists or surgeons  Many times the primary care physcians can determine the cause by following the symptoms over time  Please return immediately to the Emergency Department for fever>101, vomiting, shortness of breath, worsening symptoms, pain with exertion, passing out, intractable pain or any other concerns 
Clear